# Patient Record
Sex: MALE | Race: WHITE | NOT HISPANIC OR LATINO | Employment: OTHER | URBAN - METROPOLITAN AREA
[De-identification: names, ages, dates, MRNs, and addresses within clinical notes are randomized per-mention and may not be internally consistent; named-entity substitution may affect disease eponyms.]

---

## 2023-07-07 ENCOUNTER — TELEPHONE (OUTPATIENT)
Dept: TRANSPLANT | Facility: CLINIC | Age: 58
End: 2023-07-07

## 2023-07-18 ENCOUNTER — TELEPHONE (OUTPATIENT)
Dept: TRANSPLANT | Facility: CLINIC | Age: 58
End: 2023-07-18
Payer: COMMERCIAL

## 2023-07-21 ENCOUNTER — TELEPHONE (OUTPATIENT)
Dept: TRANSPLANT | Facility: CLINIC | Age: 58
End: 2023-07-21
Payer: COMMERCIAL

## 2023-08-09 ENCOUNTER — TELEPHONE (OUTPATIENT)
Dept: TRANSPLANT | Facility: CLINIC | Age: 58
End: 2023-08-09
Payer: COMMERCIAL

## 2023-08-10 DIAGNOSIS — Z76.82 ORGAN TRANSPLANT CANDIDATE: Primary | ICD-10-CM

## 2023-09-11 ENCOUNTER — TELEPHONE (OUTPATIENT)
Dept: TRANSPLANT | Facility: CLINIC | Age: 58
End: 2023-09-11
Payer: COMMERCIAL

## 2023-09-27 ENCOUNTER — HOSPITAL ENCOUNTER (OUTPATIENT)
Dept: RADIOLOGY | Facility: HOSPITAL | Age: 58
Discharge: HOME | End: 2023-09-27
Attending: NURSE PRACTITIONER
Payer: COMMERCIAL

## 2023-09-27 ENCOUNTER — HOSPITAL ENCOUNTER (OUTPATIENT)
Dept: RADIOLOGY | Facility: HOSPITAL | Age: 58
Discharge: HOME | End: 2023-09-27
Payer: COMMERCIAL

## 2023-09-27 ENCOUNTER — OFFICE VISIT (OUTPATIENT)
Dept: TRANSPLANT | Facility: CLINIC | Age: 58
End: 2023-09-27
Payer: COMMERCIAL

## 2023-09-27 VITALS
HEART RATE: 54 BPM | RESPIRATION RATE: 18 BRPM | DIASTOLIC BLOOD PRESSURE: 91 MMHG | TEMPERATURE: 97 F | SYSTOLIC BLOOD PRESSURE: 171 MMHG | HEIGHT: 68 IN | WEIGHT: 257.25 LBS | OXYGEN SATURATION: 97 % | BODY MASS INDEX: 38.99 KG/M2

## 2023-09-27 DIAGNOSIS — Z76.82 ORGAN TRANSPLANT CANDIDATE: ICD-10-CM

## 2023-09-27 DIAGNOSIS — I48.91 ATRIAL FIBRILLATION, UNSPECIFIED TYPE: ICD-10-CM

## 2023-09-27 DIAGNOSIS — G47.33 OSA (OBSTRUCTIVE SLEEP APNEA): ICD-10-CM

## 2023-09-27 DIAGNOSIS — N18.4 BENIGN HYPERTENSION WITH CKD (CHRONIC KIDNEY DISEASE) STAGE IV: ICD-10-CM

## 2023-09-27 DIAGNOSIS — R80.1 PERSISTENT PROTEINURIA: ICD-10-CM

## 2023-09-27 DIAGNOSIS — Z01.818 PRE-TRANSPLANT EVALUATION FOR CHRONIC KIDNEY DISEASE: Primary | ICD-10-CM

## 2023-09-27 DIAGNOSIS — E66.01 CLASS 2 SEVERE OBESITY DUE TO EXCESS CALORIES WITH SERIOUS COMORBIDITY AND BODY MASS INDEX (BMI) OF 39.0 TO 39.9 IN ADULT: ICD-10-CM

## 2023-09-27 DIAGNOSIS — I12.9 BENIGN HYPERTENSION WITH CKD (CHRONIC KIDNEY DISEASE) STAGE IV: ICD-10-CM

## 2023-09-27 DIAGNOSIS — Q61.2 AUTOSOMAL DOMINANT POLYCYSTIC KIDNEY DISEASE: ICD-10-CM

## 2023-09-27 PROBLEM — K21.9 GASTROESOPHAGEAL REFLUX DISEASE WITHOUT ESOPHAGITIS: Status: ACTIVE | Noted: 2023-03-21

## 2023-09-27 PROBLEM — N32.0 BLADDER OUTLET OBSTRUCTION: Status: ACTIVE | Noted: 2023-04-16

## 2023-09-27 PROBLEM — G89.29 CHRONIC BACK PAIN: Status: ACTIVE | Noted: 2023-04-16

## 2023-09-27 PROBLEM — N40.0 BENIGN PROSTATIC HYPERPLASIA: Status: ACTIVE | Noted: 2021-10-05

## 2023-09-27 PROBLEM — M54.9 CHRONIC BACK PAIN: Status: ACTIVE | Noted: 2023-04-16

## 2023-09-27 PROBLEM — E66.812 CLASS 2 SEVERE OBESITY DUE TO EXCESS CALORIES WITH SERIOUS COMORBIDITY AND BODY MASS INDEX (BMI) OF 39.0 TO 39.9 IN ADULT: Status: ACTIVE | Noted: 2023-09-27

## 2023-09-27 PROCEDURE — 71046 X-RAY EXAM CHEST 2 VIEWS: CPT | Mod: 26,TXP,, | Performed by: RADIOLOGY

## 2023-09-27 PROCEDURE — 72170 X-RAY EXAM OF PELVIS: CPT | Mod: 26,TXP,, | Performed by: RADIOLOGY

## 2023-09-27 PROCEDURE — 76770 US EXAM ABDO BACK WALL COMP: CPT | Mod: 26,TXP,, | Performed by: STUDENT IN AN ORGANIZED HEALTH CARE EDUCATION/TRAINING PROGRAM

## 2023-09-27 PROCEDURE — 99204 OFFICE O/P NEW MOD 45 MIN: CPT | Mod: S$GLB,TXP,, | Performed by: PHYSICIAN ASSISTANT

## 2023-09-27 PROCEDURE — 72170 XR PELVIS ROUTINE AP: ICD-10-PCS | Mod: 26,TXP,, | Performed by: RADIOLOGY

## 2023-09-27 PROCEDURE — 97802 PR MED NUTR THER, 1ST, INDIV, EA 15 MIN: ICD-10-PCS | Mod: S$GLB,TXP,,

## 2023-09-27 PROCEDURE — 99999 PR PBB SHADOW E&M-EST. PATIENT-LVL V: CPT | Mod: PBBFAC,TXP,, | Performed by: NURSE PRACTITIONER

## 2023-09-27 PROCEDURE — 72170 X-RAY EXAM OF PELVIS: CPT | Mod: TC,TXP

## 2023-09-27 PROCEDURE — 76770 US RETROPERITONEAL COMPLETE: ICD-10-PCS | Mod: 26,TXP,, | Performed by: STUDENT IN AN ORGANIZED HEALTH CARE EDUCATION/TRAINING PROGRAM

## 2023-09-27 PROCEDURE — 99205 OFFICE O/P NEW HI 60 MIN: CPT | Mod: S$GLB,TXP,, | Performed by: NURSE PRACTITIONER

## 2023-09-27 PROCEDURE — 71046 X-RAY EXAM CHEST 2 VIEWS: CPT | Mod: TC,TXP

## 2023-09-27 PROCEDURE — 99205 PR OFFICE/OUTPT VISIT, NEW, LEVL V, 60-74 MIN: ICD-10-PCS | Mod: S$GLB,TXP,, | Performed by: NURSE PRACTITIONER

## 2023-09-27 PROCEDURE — 99204 PR OFFICE/OUTPT VISIT, NEW, LEVL IV, 45-59 MIN: ICD-10-PCS | Mod: S$GLB,TXP,, | Performed by: PHYSICIAN ASSISTANT

## 2023-09-27 PROCEDURE — 71046 XR CHEST PA AND LATERAL: ICD-10-PCS | Mod: 26,TXP,, | Performed by: RADIOLOGY

## 2023-09-27 PROCEDURE — 99999 PR PBB SHADOW E&M-EST. PATIENT-LVL V: ICD-10-PCS | Mod: PBBFAC,TXP,, | Performed by: NURSE PRACTITIONER

## 2023-09-27 PROCEDURE — 99204 OFFICE O/P NEW MOD 45 MIN: CPT | Mod: S$GLB,TXP,, | Performed by: TRANSPLANT SURGERY

## 2023-09-27 PROCEDURE — 76770 US EXAM ABDO BACK WALL COMP: CPT | Mod: TC,TXP

## 2023-09-27 PROCEDURE — 99204 PR OFFICE/OUTPT VISIT, NEW, LEVL IV, 45-59 MIN: ICD-10-PCS | Mod: S$GLB,TXP,, | Performed by: TRANSPLANT SURGERY

## 2023-09-27 PROCEDURE — 97802 MEDICAL NUTRITION INDIV IN: CPT | Mod: S$GLB,TXP,,

## 2023-09-27 RX ORDER — AMLODIPINE BESYLATE 5 MG/1
5 TABLET ORAL DAILY
COMMUNITY

## 2023-09-27 RX ORDER — BENAZEPRIL HYDROCHLORIDE 40 MG/1
40 TABLET ORAL
COMMUNITY
Start: 2023-08-30

## 2023-09-27 RX ORDER — TOLVAPTAN 45 MG-15MG
KIT ORAL
COMMUNITY
Start: 2023-09-18

## 2023-09-27 RX ORDER — CARVEDILOL 25 MG/1
TABLET ORAL
COMMUNITY
Start: 2023-09-12

## 2023-09-27 RX ORDER — AMIODARONE HYDROCHLORIDE 200 MG/1
200 TABLET ORAL
COMMUNITY
Start: 2023-08-14

## 2023-09-27 RX ORDER — PANTOPRAZOLE SODIUM 40 MG/1
TABLET, DELAYED RELEASE ORAL
COMMUNITY
Start: 2023-09-21

## 2023-09-27 NOTE — LETTER
September 29, 2023        Sidney Broussard  [address]  [facility] [address]  Phone: [phone]  Fax: [phone]             [facility]  [address]  [facility] [address]  Phone: [phone]   Patient: Kenneth Phillip   MR Number: [id number]   YOB: 1965   Date of Visit: 9/27/2023       Dear Dr. Sidney Broussard    Thank you for referring Kenneth Phillip to me for evaluation. Attached you will find relevant portions of my assessment and plan of care.    If you have questions, please do not hesitate to call me. I look forward to following Kenneth Phillip along with you.    Sincerely,    Carly Bates, NP    Enclosure    If you would like to receive this communication electronically, please contact [email or web address] or [phone] to request [employer] access.    [employer] is a tool which provides read-only access to select patient information with whom you have a relationship. Its easy to use and provides real time access to review your patients record including encounter summaries, notes, results, and demographic information.    If you feel you have received this communication in error or would no longer like to receive these types of communications, please e-mail [email or web address]

## 2023-09-27 NOTE — PROGRESS NOTES
Transplant Surgery  Kidney Transplant Recipient Evaluation    Referring Physician: Sidney Broussard  Current Nephrologist: Sidney Broussard    Subjective:     Reason for Visit: evaluate transplant candidacy    History of Present Illness: Kenneth Phillip is a 58 y.o. year old male undergoing transplant evaluation.    Dialysis History: Kenneth is pre-dialysis.      Transplant History: N/A    Etiology of Renal Disease: Polycystic Kidneys (based on medical records from referral).    External provider notes reviewed: Yes    Review of Systems   Constitutional:  Negative for activity change and appetite change.   HENT:  Negative for congestion and tinnitus.    Eyes:  Negative for redness and visual disturbance.   Respiratory:  Negative for cough and shortness of breath.    Cardiovascular:  Negative for chest pain and palpitations.   Gastrointestinal:  Negative for abdominal distention and abdominal pain.   Endocrine: Negative for polydipsia and polyuria.   Genitourinary:  Negative for decreased urine volume and dysuria.   Musculoskeletal:  Negative for arthralgias and back pain.   Skin:  Negative for pallor and rash.   Allergic/Immunologic: Negative for environmental allergies and immunocompromised state.   Neurological:  Negative for tremors and headaches.   Hematological:  Negative for adenopathy. Does not bruise/bleed easily.   Psychiatric/Behavioral:  Negative for behavioral problems and confusion.      Objective:     Physical Exam:  Constitutional:   Vitals reviewed: yes   Well-nourished and well-groomed: yes  Eyes:   Sclerae icteric: no   Extraocular movements intact: yes  GI:    Bowel sounds normal: yes   Tenderness: no    If yes, quadrant/location: not applicable   Palpable masses: no    If yes, quadrant/location: not applicable   Hepatosplenomegaly: no   Ascites: no   Hernia: no    If yes, type/location: not applicable   Surgical scars: no    If yes, type/location: not applicable  Resp:   Effort normal: yes   Breath  sounds normal: yes    CV:   Regular rate and rhythm: yes   Heart sounds normal: yes   Femoral pulses normal: yes   Extremities edematous: no  Skin:   Rashes or lesions present: no    If yes, describe:not applicable   Jaundice:: no    Musculoskeletal:   Gait normal: yes   Strength normal: yes  Psych:   Oriented to person, place, and time: yes   Affect and mood normal: yes    Additional comments:  Marked central obesity    Diagnostics:  The following labs have been reviewed: CBC  CMP  The following radiology images have been independently reviewed and interpreted:     Counseling: We provided Kenneth Phillip with a group education session today.  We discussed kidney transplantation at length with him, including risks, potential complications, and alternatives in the management of his renal failure.  The discussion included complications related to anesthesia, bleeding, infection, primary nonfunction, and ATN.  I discussed the typical postoperative course, length of hospitalization, the need for long-term immunosuppression, and the need for long-term routine follow-up.  I discussed living-donor and -donor transplantation and the relative advantages and disadvantages of each.  I also discussed average waiting times for both living donation and  donation.  I discussed national and center-specific survival rates.  I also mentioned the potential benefit of multicenter listing to candidates listed with centers within more than one organ procurement organization.  All questions were answered.    Patient advised that it is recommended that all transplant candidates, and their close contacts and household members receive Covid vaccination.    Final determination of transplant candidacy will be made once evaluation is complete and reviewed by the Kidney & Kidney/Pancreas Selection Committee.    Coronavirus disease (COVID-19) caused by severe acute respiratory virus coronavirus 2 (SARS-C0V 2) is associated with  increased mortality in solid organ transplant recipients (SOT) compared to non-transplant patients. Vaccine responses to vaccination are depressed against SARS-CoV2 compared to normal individuals but improve with third vaccination doses. Vaccination prior to SOT provides both the best opportunity for transplant candidates to develop protective immunity and to reduce the risk of serious COVID19 infections post transplantation. Organ transplant candidates at [facility] will be required to receive SARS-CoV-2 vaccination prior to being listed with a an active status, whenever possible. Exceptions will be made for disability related reasons or for sincerely held Yarsani beliefs.          Transplant Surgery - Candidacy   Assessment/Plan:   Kenneth Phillip is pre-dialysis with CKD stage 4 (GFR 15-29 mL/min). I have concerns with his weight and body habitus. Based on available information, Kenneth Phillip is a marginal kidney transplant candidate. I stipulated he would need to lose weight prior to kidney transplantation. He had a CT scan at an outside hospital that should be reviewed.    Additional testing to be completed according to the Written Order Guidelines for Adult Pre-kidney and Pancreas Transplant Evaluation (KI-02).  Interpretation of tests and discussion of patient management involves all members of the multidisciplinary transplant team.    Dyllan Tenorio MD

## 2023-09-27 NOTE — PROGRESS NOTES
PRE-TRANSPLANT INFECTIOUS DISEASE CONSULT    Reason for Visit:  Pre-transplant evaluation  Referring Provider: Dr. Sidney Broussard     History of Present Illness:    58 y.o. male with a history of CKD not on HD presents for pre-kidney transplant evaluation.    Infectious History:  Recent hospital admissions: No  Recent infections: No  Recent or current antibiotic use: No  History of recurrent infections *(sinus / pneumonia / UTI / SBP)*: No  History of  foot wound or bone/joint infection: No  Recent dental infections, issues or procedures: Yes - wisdom tooth pulled - took amox before - 2 weeks ago  History of chicken pox: No  History of shingles: No  History of STI: No  History of COVID infection: No    History of Immunosuppression:  Prior chemotherapy / immunosuppression: No  Prior transplant: No  History of splenectomy: No    Tuberculosis:  Prior screening for latent TB: No  Prior diagnosis of latent TB: No  Risk factors for TB *known exposure, incarceration, homelessness*: No    Geographical exposures:  Currently lives in [place] with wife  Lived in the following states: [place]  Lived or travelled to the [facility] [place]: No  International travel: No  Travel-associated illness: No    Social/Environmental:  Occupation:  Retired  Pets: Yes   Livestock: No  Fishing / hunting: Yes - fresh water/hunt  Hobbies: Fishing  Water: [place]  Consumption of raw/undercooked meat or seafood?  No  Tobacco: No  Alcohol: Yes very occasionally  Recreational drug use:  No  Sexual partners: wife      Past Histories:   Past Medical History:   Diagnosis Date    Anemia     Atrial fibrillation     Disorder of kidney and ureter     GERD (gastroesophageal reflux disease)     Hypertension     Obesity     Polycystic kidney disease      Past Surgical History:   Procedure Laterality Date    CARDIAC ELECTROPHYSIOLOGY STUDY AND ABLATION      TOTAL KNEE ARTHROPLASTY Right      Family History   Problem Relation Age of Onset    Kidney disease Father      Polycystic kidney disease Father     Polycystic kidney disease Sister     Kidney disease Sister     Kidney disease Son     Polycystic kidney disease Son      Social History     Tobacco Use    Smoking status: Former     Current packs/day: 0.00     Types: Cigarettes     Quit date: 9/27/2013     Years since quitting: 10.0    Smokeless tobacco: Never     Review of patient's allergies indicates:   Allergen Reactions    Penicillins Other (See Comments)         Immunization History:  Received all childhood vaccines: Yes  All household members receive annual flu vaccine: No  All household members are up to date on COVID vaccine: No      Current antibiotics:  Antibiotics (From admission, onward)      None              Review of Systems  Review of Systems   Constitutional: Negative for chills, decreased appetite, fever, malaise/fatigue, night sweats, weight gain and weight loss.   HENT:  Negative for congestion, ear pain, hearing loss, hoarse voice, sore throat and tinnitus.    Eyes:  Negative for blurred vision, redness and visual disturbance.   Cardiovascular:  Negative for chest pain, leg swelling and palpitations.   Respiratory:  Negative for cough, hemoptysis, shortness of breath, sputum production and wheezing.    Endocrine: Negative for cold intolerance and heat intolerance.   Hematologic/Lymphatic: Negative for adenopathy. Does not bruise/bleed easily.   Skin:  Negative for dry skin, itching, rash and suspicious lesions.   Musculoskeletal:  Negative for back pain, joint pain, myalgias and neck pain.   Gastrointestinal:  Negative for abdominal pain, constipation, diarrhea, heartburn, nausea and vomiting.   Genitourinary:  Negative for dysuria, flank pain, frequency, hematuria, hesitancy and urgency.   Neurological:  Negative for dizziness, headaches, numbness, paresthesias and weakness.   Psychiatric/Behavioral:  Negative for depression and memory loss. The patient does not have insomnia and is not nervous/anxious.   "  Allergic/Immunologic: Negative for environmental allergies, HIV exposure, hives and persistent infections.          Objective  Physical Exam  Constitutional:       General: He is not in acute distress.     Appearance: Normal appearance. He is well-developed. He is not ill-appearing, toxic-appearing or diaphoretic.   HENT:      Head: Normocephalic and atraumatic.      Mouth/Throat:      Lips: Pink. No lesions.      Mouth: Mucous membranes are moist. No oral lesions.      Dentition: Abnormal dentition. Dental caries (filled) and dental abscesses present. No gum lesions.      Tongue: No lesions.      Palate: No lesions.   Cardiovascular:      Rate and Rhythm: Normal rate and regular rhythm.      Heart sounds: Normal heart sounds. No murmur heard.     No friction rub. No gallop.   Pulmonary:      Effort: Pulmonary effort is normal. No respiratory distress.      Breath sounds: Normal breath sounds. No wheezing or rales.   Abdominal:      General: Bowel sounds are normal. There is no distension.      Palpations: Abdomen is soft. There is no mass.      Tenderness: There is no abdominal tenderness. There is no guarding or rebound.   Skin:     General: Skin is warm and dry.   Neurological:      Mental Status: He is alert and oriented to person, place, and time.   Psychiatric:         Behavior: Behavior normal.           Labs:    CBC:   Lab Results   Component Value Date    WBC 8.17 09/27/2023    HGB 12.7 (L) 09/27/2023    HCT 40.6 09/27/2023     (H) 09/27/2023     09/27/2023    GRAN 6.2 09/27/2023    GRAN 75.8 (H) 09/27/2023    LYMPH 1.0 09/27/2023    LYMPH 12.7 (L) 09/27/2023    MONO 0.6 09/27/2023    MONO 7.1 09/27/2023    EOSINOPHIL 3.3 09/27/2023       Syphilis screening:   Lab Results   Component Value Date    RPR Non-reactive 09/27/2023        TB screening: No results found for: "TBGOLDPLUS", "TSPOTSCREN"    HIV screening:   Lab Results   Component Value Date    [id number] Non-reactive 09/27/2023 " "      Strongyloides IgG: No results found for: "STRONGANTIGG"    Hepatitis Serologies:   Lab Results   Component Value Date    HEPAIGG Non-reactive 09/27/2023    HEPBCAB Non-reactive 09/27/2023    HEPBSAB <3.00 09/27/2023    HEPBSAB Non-reactive 09/27/2023    HEPCAB Non-reactive 09/27/2023        Varicella IgG:   Lab Results   Component Value Date    VARICELLAINT Positive 09/27/2023         Immunization History   Administered Date(s) Administered    COVID-19, MRNA, LN-S, PF (MODERNA FULL 0.5 ML DOSE) 10/20/2022    COVID-19, MRNA, LN-S, PF (Pfizer) (Purple Cap) 03/19/2021, 04/18/2021, 12/10/2021          Assessment and Plan    1. Risks of Infection: Available serologies were reviewed. No unusual risks of infection or significant barriers to transplantation were identified from the infectious disease standpoint given the information available at this time.    - Acute infectious issues: None   - Pending serologies: Quantiferon gold / T-spot and Strongyloides IgG   - Please call if any pending serologic testing is positive.    2. Immunizations:  Based on the patient's immunization history and serologies, the following immunizations are recommended:  - Hepatitis A    Patient does not have immunity to hepatitis A    Vaccination ordered today: Yes   - Hepatitis B    Patient does not have immunity to hepatitis B    Vaccination ordered today: Yes   - COVID    Current CDC vaccination recommendations were discussed with the patient   - Annual high dose influenza     Vaccination ordered today: Yes   - Prevnar 20    Vaccination ordered today: Yes   - Tdap    Vaccination ordered today: Yes   - Shingrix    Vaccination ordered today: Yes    Recommended Pre-Transplant Immunization Schedule   Vaccine  0m 1m 2m 6m   Pneumococcal conjugate vaccine (Prevnar 20) X      Tetanus-diphtheria-pertussis (Tdap)* X      Hepatitis A Vaccine (Havrix)** X   X   Hepatitis B Vaccine (Heplisav)** X X     Influenza (annual) X      Zoster Recombinant " Vaccine (Shingrix) X  X           *Administer booster every 10 years.       **Administer if no immunity demonstrated on serologies               Patient will receive vaccines at local pharmacy. A written prescription was provided for all vaccine doses.     3. Counseling:   I discussed with the patient the risk for increased susceptibility to infections following transplantation including increased risk for infection right after transplant and if rejection should occur.  The patient has been counseled on the importance of vaccinations to decrease risk of infection and severe illness. Specific guidance has been provided to the patient regarding the patient's occupation, hobbies and activities to avoid future infectious complications.     4. Transplant Candidacy: Based on available information, there are no identified significant barriers to transplantation from an infectious disease standpoint.  Final determination of transplant candidacy will be made once evaluation is complete and reviewed by the Selection Committee.    5. Vaccine and serology needs:      Follow up with infectious disease as needed.       The total time for evaluation and management services performed on 09/27/2023 was greater than 45minutes.

## 2023-09-27 NOTE — PROGRESS NOTES
"TRANSPLANT NUTRITIONAL ASSESSMENT    Referring Provider: Sidney Broussard MD     Reason for Visit: Pre-kidney transplant work-up (pre-dialysis)    Age: 58 y.o.  Sex: male    Patient Active Problem List   Diagnosis    Autosomal dominant polycystic kidney disease    Atrial fibrillation    Bladder outlet obstruction    Gastroesophageal reflux disease without esophagitis    Persistent proteinuria    Benign hypertension with CKD (chronic kidney disease) stage IV    Chronic back pain    Benign prostatic hyperplasia    Class 2 severe obesity due to excess calories with serious comorbidity and body mass index (BMI) of 39.0 to 39.9 in adult    Pre-transplant evaluation for chronic kidney disease    NATASHA (obstructive sleep apnea)     Past Medical History:   Diagnosis Date    Anemia     Atrial fibrillation     Disorder of kidney and ureter     GERD (gastroesophageal reflux disease)     Hypertension     Obesity     Polycystic kidney disease      Lab Results   Component Value Date     09/27/2023    K 4.7 09/27/2023    PHOS 4.9 (H) 09/27/2023    CHOL 146 09/27/2023    HDL 35 (L) 09/27/2023    TRIG 115 09/27/2023    ALBUMIN 4.3 09/27/2023    CALCIUM 9.1 09/27/2023     Other Pertinent Labs: N/A    Current Outpatient Medications   Medication Sig    rivaroxaban (XARELTO) 20 mg Tab Take 1 tablet by mouth once daily.    amiodarone (PACERONE) 200 MG Tab Take 200 mg by mouth.    benazepriL (LOTENSIN) 40 MG tablet Take 40 mg by mouth.    carvediloL (COREG) 25 MG tablet     JYNARQUE 45 mg (AM)/ 15 mg (PM) TbSQ     pantoprazole (PROTONIX) 40 MG tablet      No current facility-administered medications for this visit.     Allergies: Penicillins    Ht Readings from Last 1 Encounters:   09/27/23 5' 7.87" (1.724 m)     Wt Readings from Last 1 Encounters:   09/27/23 116.7 kg (257 lb 4.4 oz)      BMI: Body mass index is 39.26 kg/m².    Usual Weight: 235 lbs in Lul  Weight Change/Time: unintentional 15 lb wt gain 2/2 retired and knee " replacement   Current Diet: regular  Appetite/Current Intake: good   Exercise/Physical Activity: functional in ADLs; 1 mile walk/day   Nutritional/Herbal Supplements: none  Chewing/Swallowing Problems: none  Symptoms: none    Estimated Kcal Need: 2334 kcal/day (20 kcal/kg)   Estimated Protein Need: 93 gm/day (0.8 gm/kg)     Nutritional History:   Pt and spouse present. Pt reports consuming 2 meals/day with snacks. Pt consumes vegetables less than a couple times a week.     Diet Recall    Mornin cups coffee (cream, sugar) with sausage, egg, cheese biscuit from gas station/freezer or frosted mini wheat with whole milk     Evening: enjoys beef, chicken, pork, deer, frozen pizzas, eggplant casserole, green beans, fried okra, peas, dried beans, broccoli with cheese, corn, side salad (lettuce, tomato, cheese, molina bits, Ranch)     Snacks/Desserts: chips, pretzels, fruit cups, grapes, strawberries, cookies, popcorn    Beverages: ~1 gallon water/day, brisk tea, sprite occasionally, milk daily       Seasonings: Jayden's, herbs and spice, salt     Restaurants/Fast Food: 1x/wk       Nutritional Diagnoses  Problem: food- and nutrition-related knowledge deficit  Etiology: RT lack of previous education on pre-kidney transplant nutrition recommendations  Symptoms: AEB diet recall and questions from pt     Educational Need? yes  Barriers: none identified  Discussed with: patient and spouse  Interventions: Patient taught nutrition information regarding Pre-kidney transplant work-up (pre-dialysis). Renal Nutrition Therapy packet reviewed (high/low food sources of K, Phos and protein, low sodium and fluid intake, emphasis on moderate protein intake). Encouraged physical activity daily, regular exercise as tolerated, stay mobile.  Goals/Recommendations: diet adherence and limit intake of high phosphorus foods  Actions Taken: instruct/provide written information  Patient and/or family comprehend instructions: yes  Outcome: Verbalizes  understanding  Monitoring: Contact information provided, will f/u in clinic and communicate with the care team as needed.     Counseling Time: 20 minutes

## 2023-09-27 NOTE — PROGRESS NOTES
Transplant Nephrology  Kidney Transplant Recipient Evaluation    Referring Physician: Sidney Broussard  Current Nephrologist: Sidney Broussard    Subjective:   CC:  Initial evaluation of kidney transplant candidacy.    HPI:  Mr. Phillip is a 58 y.o. year old Unknown male who has presented to be evaluated as a potential kidney transplant recipient.  He has advanced kidney disease secondary to polycystic kidney disease.  Patient is currently pre-dialysis. He has   no access  for dialysis access.   9/27/23  eGFR >60 mL/min/1.73 m^2 13.5 Abnormal      Previous Transplant: no    PKD (+ family hx: Father, sister, son)  Denies Family HX aneurysm     Donor:  yes   Discussed living donation       Fx assessment: Had a right knee replacement this year, completed rehab/PT. He continues walking ~ 1 mile every AM.   He does not appear frail.   He reports gaining ~ 8-10 lbs recently due to fluid retention and is actively trying to lose weight.     Body mass index is 39.26 kg/m².  Encourage lifestyle modifications: diet, exercise, weight loss   Needs to maintain acceptable BMI for txp/guidelines     Afib: s/p cardiac ablation  Meds:  Amiodarone and Xarelto --which he stopped ~ 2 weeks ago on his own due to bleeding of his gums --he has not notified or f/u with cards   -discussed contacting his cardiologist ASAP to discuss gum bleeding and to get guidance on Xarelto needs  Cardiologist : Dr Man    Recently diagnosed with sleep apnea and is waiting for CPAP to arrive, with ETA of 12/2023      Past Medical and Surgical History: Mr. Phillip  has a past medical history of Anemia, Atrial fibrillation, Disorder of kidney and ureter, GERD (gastroesophageal reflux disease), Hypertension, Obesity, and Polycystic kidney disease.  He has a past surgical history that includes Total knee arthroplasty (Right) and Cardiac electrophysiology study and ablation.    Past Social and Family History: Mr. Phillip reports that he quit smoking  "about 10 years ago. His smoking use included cigarettes. He has never used smokeless tobacco. His family history includes Kidney disease in his father, sister, and son; Polycystic kidney disease in his father, sister, and son.      Past Medical History:   Diagnosis Date    Anemia     Atrial fibrillation     Disorder of kidney and ureter     GERD (gastroesophageal reflux disease)     Hypertension     Obesity     Polycystic kidney disease        Review of Systems   Constitutional:  Positive for unexpected weight change.   Respiratory:  Positive for apnea (hx NATASHA) and wheezing.    Cardiovascular:  Positive for palpitations (afib) and leg swelling.   Gastrointestinal:  Positive for abdominal distention.        HX GERD   Musculoskeletal:  Positive for arthralgias (s/p right knee replacement) and back pain (chronic).   Hematological:  Bruises/bleeds easily (Xarelto).   Psychiatric/Behavioral:  Positive for sleep disturbance.        Objective:   Blood pressure (!) 171/91, pulse (!) 54, temperature 97.3 °F (36.3 °C), temperature source Tympanic, resp. rate 18, height 5' 7.87" (1.724 m), weight 116.7 kg (257 lb 4.4 oz), SpO2 97 %.body mass index is 39.26 kg/m².    Physical Exam  Constitutional:       Appearance: He is obese.   Abdominal:      General: There is distension.   Musculoskeletal:      Right lower leg: Edema present.      Left lower leg: Edema present.      Comments: ~ +2 peripheral edema bilaterally          Labs:  Lab Results   Component Value Date    WBC 8.17 09/27/2023    HGB 12.7 (L) 09/27/2023    HCT 40.6 09/27/2023     09/27/2023    K 4.7 09/27/2023     (H) 09/27/2023    CO2 22 (L) 09/27/2023    BUN 46 (H) 09/27/2023    CREATININE 4.7 (H) 09/27/2023    EGFRNORACEVR 13.5 (A) 09/27/2023    CALCIUM 9.1 09/27/2023    PHOS 4.9 (H) 09/27/2023    ALBUMIN 4.3 09/27/2023    AST 12 09/27/2023    ALT 16 09/27/2023    .9 (H) 09/27/2023       No results found for: "PREALBUMIN", "BILIRUBINUA", "GGT", " ""AMYLASE", "LIPASE", "PROTEINUA", "NITRITE", "RBCUA", "WBCUA"    No results found for: "HLAABCTYPE"    Labs were reviewed with the patient.    Assessment:     1. Pre-transplant evaluation for chronic kidney disease    2. Benign hypertension with CKD (chronic kidney disease) stage IV    3. Autosomal dominant polycystic kidney disease    4. Atrial fibrillation, unspecified type    5. Class 2 severe obesity due to excess calories with serious comorbidity and body mass index (BMI) of 39.0 to 39.9 in adult    6. Persistent proteinuria    7. NATASHA (obstructive sleep apnea)        Plan:   Body mass index is 39.26 kg/m².  Encourage to continue  lifestyle modifications: diet, exercise, weight loss   And Needing  to maintain acceptable BMI for txp/guidelines     HX HTN, Afib, medically manage, on Anticoagulation /Xarelto--cardiology clearance and Recs   Encouraged to f/u ASAP with cardiology c/o Xarelto needs    Transplant Candidacy:   Based on available information, Mr. Phillip is a suitable kidney transplant candidate.   Meets center eligibility for accepting HCV+ donor offer - Yes.  Patient educated on HCV+ donors. Kenneth is willing to accept HCV+ donor offer - Yes   Patient is a candidate for KDPI > 85 kidney donor offer - No.  Final determination of transplant candidacy will be made once workup is complete and reviewed by the selection committee.    Patient advised that it is recommended that all transplant candidates, and their close contacts and household members receive Covid vaccination.    UNOS Patient Status  Functional Status: 80% - Normal activity with effort: some symptoms of disease       Any Previous Malignancy:  no   Carly Bates NP           "

## 2023-09-28 NOTE — PROGRESS NOTES
INITIAL PATIENT EDUCATION NOTE    Mr. Kenneth Phillip was seen in pre-kidney transplant clinic for evaluation for kidney, kidney/pancreas or pancreas only transplant.  The patient attended a an individual video education session that discussed/reviewed the following aspects of transplantation: evaluation including diagnostic and laboratory testing,( Chemistries, Hematology, Serologies including HIV and Hepatitis and HLA) required for transplantation and selection committee process, UNOS waitlist management/multiple listings, types of organs offered (KDPI < 85%, KDPI > 85%, PHS risk, DCD, HCV+, HIV+ for HIV+ recipients and enbloc/dual), financial aspects, surgical procedures, dietary instruction pre- and post-transplant, health maintenance pre- and post-transplant, post-transplant hospitalization and outpatient follow-up, potential to participate in a research protocol, and medication management and side effects.  A question and answer session was provided after the presentation.    The patient was seen by all members of the multi-disciplinary team to include: Nephrologist/HAKEEM, Surgeon, , Transplant Coordinator, , Pharmacist and Dietician (if applicable).    The patient reviewed and signed all consents for evaluation which were witnessed and sent to scanning into the [facility] chart.    The patient was given an education book and plan for further evaluation based on his individual assessment.      Reviewed program requirement for complete COVID vaccination with documentation prior to listing.  COVID education information reviewed with patient. Patient encouraged to be up to date on all vaccinations.       The patient was informed that the transplant team would manage immediate post op pain. If the patient requires long term pain management, they will need to have that pain management addressed by their PCP or previous provider who wrote for long term pain medicines.    The patient was  encouraged to call with any questions or concerns.

## 2023-10-03 NOTE — PROGRESS NOTES
Transplant Recipient Adult Psychosocial Assessment    SW completed assessment with patient and pt's wife Ira Phillip in clinic.    Kenneth Phillip  [address] [facility] [address]  Telephone Information:   Mobile [phone]   Home  [phone] (home)  Work  There is no work phone number on file.  E-mail  No e-mail address on record    Sex: male  YOB: 1965  Age: 58 y.o.    Encounter Date: 2023  [place] Citizen: yes  Primary Language: English   Needed: no    Emergency Contact:  Name: Ira Phillip  Relationship: wife  Address: same as patient  Phone Numbers: [phone] (mobile) / [phone] (home)    Family/Social Support:   Number of dependents/: Pt denies having any dependents.  Marital history: Pt reports he and wife Ira have been together 20 years.  Other family dynamics: Patient's parents are .  Pt has one adult son living in [place] and one adult daughter living near pt in [place], [place].  Patient has one sister Slime Phillip who is a liver / kidney transplant recipient at [facility].    Household Composition:  Name: Kenneth Phillip  Age: 58  Relationship: patient  Does person drive? yes    Name: Ira Phillip  Age: 58  Relationship: wife  Does person drive? yes    Do you and your caregivers have access to reliable transportation? yes  PRIMARY CAREGIVER: Ira Phillip, pt's wife, will be primary caregiver, phone number [phone].      provided in-depth information to patient and caregiver regarding pre- and post-transplant caregiver role.   strongly encourages patient and caregiver to have concrete plan regarding post-transplant care giving, including back-up caregiver(s) to ensure care giving needs are met as needed.    Patient and Caregiver states understanding all aspects of caregiver role/commitment and is able/willing/committed to being caregiver to the fullest extent necessary.    Patient and Caregiver verbalizes understanding of the  education provided today and caregiver responsibilities.         remains available. Patient and Caregiver agree to contact  in a timely manner if concerns arise.      Able to take time off work without financial concerns:  N/A - Ira does not work .     Additional Significant Others who will Assist with Transplant:  Patient denies having additional caregivers identified at this time.  SW strongly encouraged pt to speak with family and friends to identify additional caregivers who can provide support during pt's transplant recovery in the event pt's wife Ira would be unable to do so.    Living Will: no  Healthcare Power of : no  Advance Directives on file: <<no information> per medical record.  Verbally reviewed LW/HCPA information.   provided patient with copy of LW/HCPA documents and provided education on completion of forms.    Living Donors: No. Education and resource information given to patient.    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  Pt reports retiring in January 2023.    Spouse/Significant Other Employment: Pt's wife Ira does not work.    Disabled: no - Pt states plans of applying for SSI / SSDI.    Monthly Income:  halfway: $2,500 (via pt's 401k)  Able to afford all costs now and if transplanted, including medications: yes  Patient and Caregiver verbalizes understanding of personal responsibilities related to transplant costs and the importance of having a financial plan to ensure that patients transplant costs are fully covered.     Social  worker provided fundraising information/education.  Patient and Caregiver verbalizes understanding.   remains available.    Insurance:   Payer/Plan Subscr  Sex Relation Sub. Ins. ID Effective Group Num   1. [employer]* SUZE LAYNE 1965 Male Self [id number] 3/1/23                                    [address]   2. [employer] NETW* SUZE LAYNE 1965 Male Self [id number]                                    [address] [facility] [id number]     Primary Insurance (for UNOS reporting): Private Insurance - via Healthcare Marketplace / Obamacare  Secondary Insurance (for UNOS reporting): None - Pt states plans of applying for SSI / SSDI and will explore options for Medicare coverage in the event pt is approved for SSDI.  Patient and Caregiver verbalizes clear understanding that patient may experience difficulty obtaining and/or be denied insurance coverage post-surgery. This includes and is not limited to disability insurance, life insurance, health insurance, burial insurance, long term care insurance, and other insurances.    Patient and Caregiver also reports understanding that future health concerns related to or unrelated to transplantation may not be covered by patient's insurance.  Resources and information provided and reviewed.      Patient and Caregiver provides verbal permission to release any necessary information to outside resources for patient care and discharge planning.  Resources and information provided are reviewed.      Dialysis Adherence:  Patient reports currently being pre-dialysis, current nephrologist being Sidney Broussard MD, and maintaining full adherence to Dr. Broussard's plan of care and recommendations.  Per SW's review of nephrology visit notes in chart (via Care Everywhere), no concerns regarding pt's treatment compliance noted over the past 3 months.    Infusion Service: patient utilizing? no  Home Health: patient utilizing? no  DME: yes - CPAP; cane; crutches;  walker - pt only required use of DME while recovering from knee replacement surgery  Pulmonary/Cardiac Rehab: no   ADLS:  Pt reports being independent with all ADLs and mobility and driving himself.    Adherence:   Pt/caregiver reports pt has exhibited good adherence to medication regimen, appointment schedule, and medical recommendations in the past.  Adherence education and counseling provided.     Per History Section:  Past Medical History:   Diagnosis Date    Anemia     Atrial fibrillation     Disorder of kidney and ureter     GERD (gastroesophageal reflux disease)     Hypertension     Obesity     Polycystic kidney disease      Social History     Tobacco Use    Smoking status: Former     Current packs/day: 0.00     Types: Cigarettes     Quit date: 9/27/2013     Years since quitting: 10.0    Smokeless tobacco: Never   Substance Use Topics    Alcohol use: Not on file     Social History     Substance and Sexual Activity   Drug Use Not on file     Social History     Substance and Sexual Activity   Sexual Activity Not on file       Per Today's Psychosocial:  Tobacco: none, patient denies any use.  Pt reports quitting smoking at age 48 and was smoking 1 ppd prior to quitting.  Alcohol: Pt reports ETOH use as only on occasion and typically drinking 1 or 2 beers.  Illicit Drugs/Non-prescribed Medications: none, patient denies any use.  Pt reports maintaining prescription for opioid pain medication prescribed by nephrologist due to pain associated with PKD.    Patient and Caregiver states clear understanding of the potential impact of substance use as it relates to transplant candidacy and is aware of possible random substance screening.  Substance abstinence/cessation counseling, education and resources provided and reviewed.     Arrests/DWI/Treatment/Rehab: patient denies    Psychiatric History:    Mental Health: Pt denies any past and/or present mental health symptoms and/or diagnoses.  Psychiatrist/Counselor: Pt  denies ever receiving care from psychiatrist/counselor.  Medications:  Pt denies ever being prescribed medications for mental health care.  Suicide/Homicide Issues: Pt denies any past and/or present SI/HI.   Safety at home: Pt denies having any past or present concerns regarding safety at home including but not limited to physical/emotional/sexual abuse, neglect, or exploitation.    Knowledge: Patient and Caregiver states having clear understanding and realistic expectations regarding the potential risks and potential benefits of organ transplantation and organ donation, agrees to discuss with health care team members and support system members, and to utilize available resources and express questions and/or concerns in order to further facilitate the pt informed decision-making.  Resources and information provided and reviewed.     Patient and Caregiver is aware of [facility]'s affiliation and/or partnership with agencies in home health care, LTAC, SNF, [facility] and other hospitals and clinics.    Understanding: Patient and Caregiver reports having a clear understanding of the many lifetime commitments involved with being a transplant recipient, including costs, compliance, medications, lab work, procedures, appointments, concrete and financial planning, preparedness, timely and appropriate communication of concerns, abstinence (ETOH, tobacco, illicit non-prescribed drugs), adherence to all health care team recommendations, support system and caregiver involvement, appropriate and timely resource utilization and follow-through, mental health counseling as needed/recommended, and patient and caregiver responsibilities.  Social Service Handbook, resources and detailed educational information provided and reviewed.  Educational information provided.    Patient and Caregiver also reports current and expected compliance with health care regime and states having a clear understanding of the importance of compliance.       Patient and Caregiver reports a clear understanding that risks and benefits may be involved with organ transplantation and with organ donation.      Patient and Caregiver also reports clear understanding that psychosocial risk factors may affect patient, and include but are not limited to feelings of depression, generalized anxiety, anxiety regarding dependence on others, post traumatic stress disorder, feelings of guilt and other emotional and/or mental concerns, and/or exacerbation of existing mental health concerns.  Detailed resources provided and discussed.     Patient and Caregiver agrees to access appropriate resources in a timely manner as needed and/or as recommended, and to communicate concerns appropriately.  Patient and Caregiver also reports a clear understanding of treatment options available.      reviewed education, provided additional information, and answered questions.    Feelings or Concerns: Pt expresses motivation to continue pursuing transplant and denies any transplant related concerns at this time.    Coping: Identify Patient & Caregiver Strategies to [place]:   1. In the past - hunting / fishing; family support   2. Currently & Pre-transplant - same as above   3. At the time of surgery - family support   4. During post-Transplant & Recovery Period - all of the above    Goals: Pt reports post-transplant goals include living longer, vacationing, and enjoying alf.  Patient referred to Vocational Rehabilitation.    Interview Behavior: Patient and Caregiver presents as alert and oriented x 4, pleasant, good eye contact, well groomed, recall good, concentration/judgement good, average intelligence, calm, communicative, cooperative, and asking and answering questions appropriately.  Patient accompanied to clinic by wife Ira Phillip who presents as highly attentive, engaged, and supportive in pt's pursuit of transplant.         Transplant Social Work -  Candidacy  Assessment/Plan:     Psychosocial Suitability: Based on psychosocial risk factors, patient presents as  low risk  candidate for kidney transplant at this time due to established caregiver plan, no documented non-adherence to nephrology plan of care, no reported history of substance abuse and/or mental health concerns, adequate insurance coverage and personal finances to cover transplant cost, and realistic beliefs and expectations regarding risks and benefits of transplant.  Patient would benefit from identifying secondary caregiver support in case wife / primary caregiver is ever unable to provide support during pt's transplant recovery.    Final determination of transplant candidacy to be made by Transplant Selection Committee.    Recommendations/Additional Comments: SW recommends pt establish local lodging plan for immediate post-transplant recovery period.  SW recommends pt identify secondary caregiver support.  SW recommends that pt conduct fundraising to assist pt with pay for cost of medications, food, gas, and other transplant related needs.  SW recommends that pt remain aware of potential mental health concerns and contact the team if any concerns arise.  SW recommends that pt remain abstinent from tobacco, ETOH, and drug use.  SW supports pt's continued adherence. SW remains available to answer any questions or concerns that arise as the pt moves through the transplant process.     Joe Bello

## 2023-10-04 ENCOUNTER — DOCUMENTATION ONLY (OUTPATIENT)
Dept: TRANSPLANT | Facility: CLINIC | Age: 58
End: 2023-10-04
Payer: COMMERCIAL

## 2023-10-13 ENCOUNTER — TELEPHONE (OUTPATIENT)
Dept: TRANSPLANT | Facility: CLINIC | Age: 58
End: 2023-10-13
Payer: COMMERCIAL

## 2023-10-13 NOTE — TELEPHONE ENCOUNTER
Patient mailed a copy of stress and echo to me. I should be receiving it Thursday. Patient informed that once records are received, I can review them and determine if patient can be presented to kidney selection committee. Patient and wife both voiced understanding.     ----- Message from Luz Maria Washington sent at 10/13/2023 10:12 AM CDT -----  Regarding: Patient advice  Contact: Pt [phone]            Name of Caller:  Kenneth Saunders Preference:  [phone]    Nature of Call:  States he has completed everything needed from him would like to touch bases

## 2023-10-24 ENCOUNTER — TELEPHONE (OUTPATIENT)
Dept: TRANSPLANT | Facility: CLINIC | Age: 58
End: 2023-10-24
Payer: COMMERCIAL

## 2023-10-24 NOTE — PROGRESS NOTES
PHARM.D. PRE-TRANSPLANT NOTE:    This patient's medication therapy was evaluated as part of his pre-transplant evaluation.      The following general pharmacologic concerns were noted: on rivaroxaban for atrial fibrillation     The following concerns for post-operative pain management were noted: none     The following pharmacologic concerns related to HCV therapy were noted: none       This patient's medication profile was reviewed for considerations for DAA Hepatitis C therapy:    [X]  No current inducers of CYP 3A4 or PGP  [Yes]  No amiodarone on this patient's EMR profile in the last 24 months  [Yes]  No past or current atrial fibrillation on this patient's EMR profile       Current Outpatient Medications   Medication Sig Dispense Refill    amiodarone (PACERONE) 200 MG Tab Take 200 mg by mouth.      amLODIPine (NORVASC) 5 MG tablet Take 5 mg by mouth once daily.      benazepriL (LOTENSIN) 40 MG tablet Take 40 mg by mouth.      carvediloL (COREG) 25 MG tablet       JYNARQUE 45 mg (AM)/ 15 mg (PM) TbSQ       pantoprazole (PROTONIX) 40 MG tablet       rivaroxaban (XARELTO) 20 mg Tab Take 1 tablet by mouth once daily.       No current facility-administered medications for this visit.           I am available for consultation and can be contacted, as needed by the other members of the Transplant team.

## 2023-10-25 ENCOUNTER — TELEPHONE (OUTPATIENT)
Dept: TRANSPLANT | Facility: CLINIC | Age: 58
End: 2023-10-25
Payer: COMMERCIAL

## 2023-10-25 NOTE — TELEPHONE ENCOUNTER
Compliance form resent to [facility] Fax: [phone]  Phone: [phone]  Sidney Pineda MA

## 2023-10-26 ENCOUNTER — TELEPHONE (OUTPATIENT)
Dept: TRANSPLANT | Facility: CLINIC | Age: 58
End: 2023-10-26
Payer: COMMERCIAL

## 2023-10-26 NOTE — TELEPHONE ENCOUNTER
MA notes per Pre Dialysis adherence form     FOR THE PAST THREE MONTHS:    0-Appt Adhrence  0-No show    No concerns with labs, care giving, transportation, or mental health    Scanned in pt's media     Eva Goodson  Abdominal Transplant MA

## 2023-10-27 ENCOUNTER — TELEPHONE (OUTPATIENT)
Dept: TRANSPLANT | Facility: CLINIC | Age: 58
End: 2023-10-27
Payer: COMMERCIAL

## 2023-10-27 ENCOUNTER — COMMITTEE REVIEW (OUTPATIENT)
Dept: TRANSPLANT | Facility: CLINIC | Age: 58
End: 2023-10-27
Payer: COMMERCIAL

## 2023-10-27 NOTE — COMMITTEE REVIEW
Native Organ Dx: Polycystic Kidneys      SELECTION COMMITTEE NOTE    Kenneth Phillip was presented at selection committee on 10/27/2023.  Patient met selection criteria for kidney transplant related to CKD, Stage 5 due to   Polycystic Kidneys.  No absolute contraindications to transplant at this time.  Patient will be placed on the cadaveric wait list pending  clarification from cardiology in regarding patient stopping anticoagulation (Xarelto) , CT review by surgeon to determine amount of weight loss needed for surgery (PKD)  and final financial approval from insurance company.  Patient will return to clinic for routine appointment in 1 year(s). Patient does meet criteria for High KDPI kidney offer. Patient does meet HCV+ donor offer. Patient does not meet criteria for dual/enbloc, due to weight. Planned immunosuppression Thymo.    Spoken to Lauren with Dr. Man, she stated that they have a telephone note stating that patient stop his Xarelto due to bleeding gums and bruising. Patient is aware of stroke risk associated with stopping it. She will clarify with Dr. Man to make sure that he's aware since he cleared the patient for transplant.    Attempted call to patient, per pt's wife, pt was in the woods. She will have him call me.      Note written by Serina Mcbride RN    ===============================================    I was present at the meeting and attest to the decision of the committee.    Nikita Dela Cruz  10/27/2023

## 2023-10-27 NOTE — LETTER
October 27, 2023    Sidney Broussard MD  [address] [place] [address]  Phone: [phone]  Fax: [phone]     Dear Dr. Broussard:    Patient: Kenneth Phillip   MR Number: [id number]   YOB: 1965     Your patient, Kenneth Phillip, was recently discussed at the [facility] Selection Committee.  I am happy to inform you that Kenneth has been approved for transplantation pending  clarification from patient's cardiology in regarding patient stopping anticoagulation (Xarelto)  and CT image review by our transplant surgeons to determine amount of weight loss needed for surgery .  He has met selection criteria for a kidney transplant related to CKD stage 5 secondary to primary diagnosis of Polycystic Kidneys. Your patient will be placed on the cadaveric wait list pending final financial approval from insurance company.     We appreciate your confidence in allowing us to participate in your patients care.      If you have any questions or concerns, please do not hesitate to contact me.    Sincerely,      Macy Nevarez MD  Medical Director, Kidney & Kidney/Pancreas Transplantation      CC:  Kenneth Phillip (patient)

## 2023-10-27 NOTE — TELEPHONE ENCOUNTER
Patient informed of committee's decision. All questions were answered and patient voiced understanding.   ----- Message from Lesly Szymanski MA sent at 10/27/2023  1:19 PM CDT -----  Regarding: FW: return call  Contact: Kenneth    ----- Message -----  From: Raquel Barrera  Sent: 10/27/2023  12:23 PM CDT  To: [facility] Pre-Kidney Transplant Non-Clinical  Subject: return call                                      Caller:Kenneth        Returning call to: Serina        Caller can be reached @: [phone] (home)      [phone] ( mobile)    Pt staes he believe the call is to see if some info has been received by him that he sent in the mail. Please advise.

## 2023-11-09 ENCOUNTER — EPISODE CHANGES (OUTPATIENT)
Dept: TRANSPLANT | Facility: CLINIC | Age: 58
End: 2023-11-09

## 2023-11-10 ENCOUNTER — COMMITTEE REVIEW (OUTPATIENT)
Dept: TRANSPLANT | Facility: CLINIC | Age: 58
End: 2023-11-10
Payer: COMMERCIAL

## 2023-11-10 DIAGNOSIS — Z76.82 ORGAN TRANSPLANT CANDIDATE: Primary | ICD-10-CM

## 2023-11-10 NOTE — COMMITTEE REVIEW
Native Organ Dx: Polycystic Kidneys      Outside CT reviewed from 11/18/21, patient will need up to date CT scan to assess for vascular calcifications and space from a PKD standpoint. Will assess if pt needs to lose weight once CT reviewed.   Clarification from Dr. Man, cardiology, in regarding patient stopping anticoagulation (Xarelto). Patient is still cleared from his standpoint. Reviewed by committee. Ok per committee.     Attempted call to patient, unable to leave a vm due to calls being screens per recording.     Note written by Serina Mcbride RN    ===============================================    I was present at the meeting and attest to the general consensus of the committee.   Kerwin Shea Jr.

## 2023-11-29 ENCOUNTER — TELEPHONE (OUTPATIENT)
Dept: TRANSPLANT | Facility: CLINIC | Age: 58
End: 2023-11-29
Payer: COMMERCIAL

## 2023-11-30 ENCOUNTER — TELEPHONE (OUTPATIENT)
Dept: TRANSPLANT | Facility: CLINIC | Age: 58
End: 2023-11-30
Payer: COMMERCIAL

## 2023-12-05 ENCOUNTER — TELEPHONE (OUTPATIENT)
Dept: TRANSPLANT | Facility: CLINIC | Age: 58
End: 2023-12-05
Payer: COMMERCIAL

## 2023-12-05 NOTE — TELEPHONE ENCOUNTER
----- Message from Devan Ren sent at 2023  1:13 PM CST -----  Regarding: Status Update  Good Afternoon,    I'm checking on the status of Mr. Cooper viktor clinicals. He was approved pending in selection on 10/27/2023. His evaluation auth will  on 2023 and I will need to know how to proceed in matter. Please advise if eval auth extension or listing auth will be needed?    Thanks,    Devan BERUMEN

## 2023-12-13 ENCOUNTER — HOSPITAL ENCOUNTER (OUTPATIENT)
Dept: RADIOLOGY | Facility: HOSPITAL | Age: 58
Discharge: HOME | End: 2023-12-13
Attending: NURSE PRACTITIONER
Payer: COMMERCIAL

## 2023-12-13 DIAGNOSIS — Z76.82 ORGAN TRANSPLANT CANDIDATE: ICD-10-CM

## 2023-12-13 PROCEDURE — 74176 CT ABDOMEN PELVIS WITHOUT CONTRAST: ICD-10-PCS | Mod: 26,TXP,, | Performed by: RADIOLOGY

## 2023-12-13 PROCEDURE — 74176 CT ABD & PELVIS W/O CONTRAST: CPT | Mod: 26,TXP,, | Performed by: RADIOLOGY

## 2023-12-13 PROCEDURE — 74176 CT ABD & PELVIS W/O CONTRAST: CPT | Mod: TC,TXP

## 2024-01-18 ENCOUNTER — TELEPHONE (OUTPATIENT)
Dept: TRANSPLANT | Facility: CLINIC | Age: 59
End: 2024-01-18
Payer: COMMERCIAL

## 2024-01-18 NOTE — TELEPHONE ENCOUNTER
----- Message from Levon Maciel sent at 1/18/2024  2:14 PM CST -----  Regarding: Consult/Advisory  Contact: Kenneth Phillip  Consult/Advisory    Name Of Caller: Kenneth Phillip       Contact Preference: [phone] (home)      Nature of call: Patient calling to speak to staff for follow up as he had CT scan and has never heard anything back after committee review decision. Requesting a call back.

## 2024-03-05 ENCOUNTER — TELEPHONE (OUTPATIENT)
Dept: TRANSPLANT | Facility: CLINIC | Age: 59
End: 2024-03-05
Payer: COMMERCIAL

## 2024-03-05 NOTE — TELEPHONE ENCOUNTER
Patient i    ----- Message from Jason L[place]y sent at 3/5/2024  1:33 PM CST -----  Regarding: call back  Pt call to speak with coordinator in regards to transplant status requesting call back    Call

## 2024-03-05 NOTE — TELEPHONE ENCOUNTER
Patient informed that I'm awaiting financial clearance from his insurance before I can list him. Follow up email sent to out [facility] for an update.

## 2024-03-12 ENCOUNTER — TELEPHONE (OUTPATIENT)
Dept: TRANSPLANT | Facility: CLINIC | Age: 59
End: 2024-03-12
Payer: COMMERCIAL

## 2024-03-12 DIAGNOSIS — Z76.82 ORGAN TRANSPLANT CANDIDATE: Primary | ICD-10-CM

## 2024-03-12 NOTE — LETTER
2024    Kenneth Phillip  [address] [place] [address]    Dear eKnneth Phillip:  MRN: [id number]    Congratulations! On 3/12/2024, you were placed on  the waiting list for a  donor transplant.    Your candidacy for kidney transplant is based on the following criteria: CKD stage 5 due to Polycystic Kidneys.    Your transplant coordinator while on the waiting list is Ella Hemphill RN. They can be reached at [phone] or [phone] with any questions.      What to do now?    Ask your living donors to begin testing   Share our screening website with anyone interested: [email or web address]  Make sure donors have your name and date of birth  You will get transplanted much faster if you have a living donor    Have your blood sent to our Transplant Lab every month  If you are on dialysis - our Transplant Lab will work with your dialysis unit to send your blood every month  If you are not on dialysis   If you live near an [facility] lab, we will schedule you to have blood drawn every month  If you do not live near an [facility] lab, you will be sent blood kits in the mail. You will need to take a kit to your local lab or doctor to have your blood drawn every month and mail to the Transplant Lab.     Call us with ANY CHANGES  Phone numbers - we must be able to reach you anytime of the day or night when a kidney is available  Address  Insurance coverage  Dialysis unit or kidney doctor  Dao: if you have surgery, stay in the hospital, have to get blood, or have an infection    Review your Kidney/Kidney-Pancreas Transplant Guide   This will give you detailed information about what happens when  you are on the waiting list   you are called when a kidney is available    The [facility] has a transplant surgeon and physician available 365 days a year, 24 hours a day to coordinate organ acceptance, procurement, surgical placement and to address urgent patient care issues.  You  will be notified in writing of any changes to our Transplant Centers staffing plan that would impact your ability to receive a transplant.        Attached is a letter from the [employer] Organ Sharing (UNOS). It describes the services and information offered to patients by [employer] and the Organ Procurement and Transplant Network. We look forward to working with you while on the waiting list.     We would like to inform you of an important OPTN (Organ Procurement and Transplant Network) Policy change that may affect the waiting time for some candidates on our waiting list.     Waiting time is important in identifying who receives offers for kidneys. A long wait time may increase your chance of getting an offer. Wait time is based on a test called eGFR that tells how well your kidneys are working. Wait time could also be based on how long you have been on dialysis. Government and health officials have changed the way this test is used. Before this year, hospitals used an eGFR that would include your race. For Black or  Americans, this eGFR could have shown that their kidneys were working better than they were.    Because of this change, we are looking at everyones record and assessing waiting time for people who are eligible. We will be reviewing everyones medical records and will contact you if you are eligible.     Who can I talk to if I have a question?  You can contact us if you have questions or send a message through [facility].     Please give us time to answer your questions. We are working on this for many patients.    How can I learn more about eGFR and this policy change?  Go to OPTN website > Patients > Kidney > FAQ: Understanding race-neutral eGFR calculations  Full URL: https://optn.transplant.hrsa.gov/patients/by-organ/kidney/understanding-the-proposal-to-require-race-neutral-egfr-calculations/  Call the Organ Procurement and Transplantation Network (OPTN) toll-free patient services line  at 7-072-369-0401    Congratulations,    Your Transplant Partner  [facility]   [facility] [place] [address]  [phone]  lh/enclosure    CC:  Sidney Broussard MD                                             The Organ Procurement and Transplantation Network   Toll-free patient services line: [phone]  Your resource for organ transplant information      Staffed 8:30 am - 5:00 pm ET Monday - Friday   Leave a message 24/7 to receive a call back    The Organ Procurement and Transplantation Network (OPTN) is the national transplant system. It makes the policies that decide how donated organs are matched to patients waiting for a transplant. The OPTN:    Makes sure donated organs get matched to people on the transplant waiting list  Tells people about the donation and transplant processes  Makes sure that the public knows about the need for more organ and tissue donations    The OPTN has a free patient services line that you can call to:  Get more information about:   o Organ donation and organ transplants   o Donation and transplant policies  Get an information kit with:   o A list of transplant hospitals   o Waiting list information  Talk about any questions you may have about your transplant hospital or organ procurement organization. The staff will do their best to help you or point you to others who may help.  Find out how you can volunteer with the OPTN and help shape transplant policy    The patient services line number is: 7-650-745-0670    Patient services line staff CANNOT answer questions about your own medical care, including:  Waiting list status  Test results  Medical records  You will need to call your transplant hospital for this information.    The following websites have more information about transplantation and donation:  OPTN: https://optn.transplant.[facility].gov/  For potential living donors and transplant recipients:   o Living with transplant:  https://www.transplantliving.org/   o Living donation process: https://optn.transplant.hrsa.gov/living-donation/     o Financial assistance: https://www.livingdonorassistance.org/  Transplantation data: https://www.srtr.org/  Organ donation: https://www.organdonor.gov/    Volunteer with the OPTN: https://optn.transplant.hrsa.gov/get-involved

## 2024-03-12 NOTE — TELEPHONE ENCOUNTER
"  KIDNEY WAIT LISTING NOTE    Date of Financial clearance to list: 3/7/24    SSN/[facility]:     Organ: Kidney    Last Name: Marjan  First Name: Kenneth    : 1965       Gender: male        MRN#: [id number]                                   State of Permanent Residence: [address] [place] [address] [place] [address]    Ethnicity: Not  or /a   Race:      White    CLINICAL INFORMATION   Candidate Medical Urgency Status: Active (1)  Number of Previous Kidney Transplants: 0  Number of Previous Solid Organ Transplants:0   Did you enter number of previous kidney or other solid organ transplants? Yes  Is this Candidate a Prior Living Donor: No  (If yes, please generate letter to [employer] with patient's date of donation, recipient SSN, signed by Surgical Director after patient is listed in order to receive priority points).      ABO  ABO Blood Group:   A NEG     ABO Confirmation: (THESE DATES MUST BE PRIOR TO THE LIST DATE AND SUPPORTED BY SEPARATE LAB REPORTS)    Internal Results    Lab Results   Component Value Date    GROUPTRH A NEG 2023     No results found for: "ABO"    External Results    ABO Date 1:    ABO Date 2  Are either of these ABO results based on External Labs? No  (If Yes, STOP and go to source document in Media Tab for verification).    VITALS  Height:  5" 7.8"  Weight: 257 lbs   (Use height from Transplant clinic visits only).  Did you enter height/weight? Yes    HLA    Class I:  Lab Results   Component Value Date    VURS5EM 1 2023    NTNZ3GH 3 2023    CKIF6TH 65 2023    IFMT4JQ 38 2023    CCHSX8IM 6 2023    TUNOP0JW 4 2023    ZETZJ3WX 6 2023    KFLSP7GM 8 2023       Class II:  Lab Results   Component Value Date    ZDOGSB30JB 17 2023    YYAVGJ07BQ 11 2023    FEHHEY621TS 52 2023    WSVARH2866 XX 2023    KJHSB0FA 2 2023    YIZWO9NB 7 2023       Tested for HLA Antibodies: Yes, no antibodies detected     If result is " ""Positive" antibodies are detected     If result is "Negative or questionable" no antibodies detected    No results found for: "CIPRAS", "CIIPRAS"    DIALYSIS INFORMATION  Is patient Pre-Dialysis: Yes     GFR Information  Report GFR being used as the criteria for placement on the kidney list. If not, leave blank  GFR < or = 20 ml/min? Yes  If Yes, Specify value  _13.5__   ml/min     Initial date GFR became 20 or less: 9/27/23  Is GFR obtained from an Outside lab Result? No  (If YES verify with source document scanned into media)    If patient on Dialysis:    Is candidate currently on dialysis for ESRD? No  If Yes,  Date Chronic Dialysis Started:   (Not currently on dialysis)  (verify with source document in Media Tab)   Dialysis Unit Name: Pre Dialysis [facility] draw                        Physician Name:  Dr. Macy Powell  NPI#: [id number]    DIABETES INFORMATION  Primary Native Kidney Diagnosis: Polycystic Kidneys  C-Peptide Value - No results found for: "CPEPTIDE"  Current Diabetes Status: None    FOR NON-KIDNEY DEPARTMENT USE ONLY:  Additional Organs Registered? none    Maximum Acceptable Number of HLA Mismatches  ABDR:     6      (0-6)               AB:               (0-4)  ADR:   _____  (0-4)              BDR: _____ (0-4)  A:        _____  (0-2)              B:      _____ (0-2)          DR: ______ (0-2)    Will Recipient Accept?   Accept HBcAB Positive Organ:            Yes  Accept HBV NO Positive Organ:        No  Accept HCV Antibody Positive Organ: Yes   Accept HCV NO Positive Organ: Yes    Dual Kidney and En Bloc Opt In : No  Dual  Local:   No  Dual Import:   No  En Bloc Local:   No  En Bloc Import: No     Accept KDPI > 85: Single: No     Local: No     Import: No  Accept KDPI > 85: Dual: No     Local: No     Import: No    ### NURSE TO VERIFY CONSENT AND MAKE ANY NECESSARY CHANGES NEEDED IN UNET AT THE TIME OF VERIFICATION ###    Unacceptible Antigens  If yes, list     Lab Results   Component " Value Date    CIABCLM WEAK A11 09/27/2023    CIIAB Negative 09/27/2023       ### DO NOT LIST IF ANTIGEN VALUE WEAK ###    Hep B Vaccine series completed: no    Blood Type x2 was verified by myself and Keyana.  Blood type determination and reporting was completed according to the programs protocols and OPTN requirements.

## 2024-03-20 DIAGNOSIS — Z76.82 AWAITING ORGAN TRANSPLANT STATUS: Primary | ICD-10-CM

## 2024-05-03 PROCEDURE — 86832 HLA CLASS I HIGH DEFIN QUAL: CPT | Mod: TXP | Performed by: NURSE PRACTITIONER

## 2024-05-03 PROCEDURE — 86833 HLA CLASS II HIGH DEFIN QUAL: CPT | Mod: TXP | Performed by: NURSE PRACTITIONER

## 2024-05-10 ENCOUNTER — LAB VISIT (OUTPATIENT)
Dept: LAB | Facility: HOSPITAL | Age: 59
End: 2024-05-10
Payer: COMMERCIAL

## 2024-05-10 DIAGNOSIS — Z76.82 AWAITING ORGAN TRANSPLANT STATUS: ICD-10-CM

## 2024-05-21 LAB — HPRA INTERPRETATION: NORMAL

## 2024-05-31 DIAGNOSIS — Z76.82 ORGAN TRANSPLANT CANDIDATE: Primary | ICD-10-CM

## 2024-06-03 ENCOUNTER — TELEPHONE (OUTPATIENT)
Dept: TRANSPLANT | Facility: CLINIC | Age: 59
End: 2024-06-03
Payer: COMMERCIAL

## 2024-06-03 NOTE — LETTER
Ligia 3, 2024    Kenneth Phillip  [address]  [facility] [address]        Dear Kenneth Phillip:  MRN: [id number]  : 1965    You are scheduled on 2024 for follow up in the transplant clinic to update your records and evaluate your health status as you wait for a transplant.  It is very important that we ensure you are ready for your transplant.      Please make arrangements to be in our transplant clinic from 12:30 pm- 4 pm.  During this time you will watch an educational video and then be seen by our transplant , financial counselor, and advance practice provider.     If you have had a change in your medical history since your last visit, please call your transplant coordinator to ensure we have the medical records to review prior to your appointment.     Please bring the following with you to this appointment:  A Caregiver is REQUIRED  Bring ALL insurance information including medication card  Current list of medications  Copies of any outside medical records from the past year, such as: mammogram, pap smear, ultrasound, CAT scan, colonoscopy, cardiac angiogram or cardiac stress test    To reschedule your appointments please call [phone] or [phone] ([phone]). Please ask for the .      Failure to cancel in advance or arrive on time could result in a $50 cancellation fee.  Your transplant candidacy could be affected by no showing these appointments.  We look forward to seeing you.    Sincerely,    [facility] [place]  [facility] [place] [address]  [phone]

## 2024-06-05 PROCEDURE — 99001 SPECIMEN HANDLING PT-LAB: CPT | Mod: TXP | Performed by: NURSE PRACTITIONER

## 2024-06-11 ENCOUNTER — LAB VISIT (OUTPATIENT)
Dept: LAB | Facility: HOSPITAL | Age: 59
End: 2024-06-11
Payer: COMMERCIAL

## 2024-06-11 DIAGNOSIS — Z76.82 ORGAN TRANSPLANT CANDIDATE: ICD-10-CM

## 2024-06-15 LAB
HLA FREEZE AND HOLD INTERPRETATION: NORMAL
HLAFH COLLECTION DATE: NORMAL
HPRA INTERPRETATION: NORMAL

## 2024-07-10 PROCEDURE — 86832 HLA CLASS I HIGH DEFIN QUAL: CPT | Mod: TXP | Performed by: NURSE PRACTITIONER

## 2024-07-10 PROCEDURE — 86833 HLA CLASS II HIGH DEFIN QUAL: CPT | Mod: TXP | Performed by: NURSE PRACTITIONER

## 2024-07-22 ENCOUNTER — LAB VISIT (OUTPATIENT)
Dept: LAB | Facility: HOSPITAL | Age: 59
End: 2024-07-22
Payer: COMMERCIAL

## 2024-07-22 DIAGNOSIS — Z76.82 AWAITING ORGAN TRANSPLANT STATUS: ICD-10-CM

## 2024-07-26 LAB — HPRA INTERPRETATION: NORMAL

## 2024-08-05 ENCOUNTER — TELEPHONE (OUTPATIENT)
Dept: TRANSPLANT | Facility: CLINIC | Age: 59
End: 2024-08-05
Payer: COMMERCIAL

## 2024-08-06 PROCEDURE — 99001 SPECIMEN HANDLING PT-LAB: CPT | Mod: TXP | Performed by: NURSE PRACTITIONER

## 2024-08-14 ENCOUNTER — LAB VISIT (OUTPATIENT)
Dept: LAB | Facility: HOSPITAL | Age: 59
End: 2024-08-14
Payer: COMMERCIAL

## 2024-08-14 DIAGNOSIS — Z76.82 AWAITING ORGAN TRANSPLANT STATUS: ICD-10-CM

## 2024-08-15 ENCOUNTER — TELEPHONE (OUTPATIENT)
Dept: CARDIOLOGY | Facility: HOSPITAL | Age: 59
End: 2024-08-15
Payer: COMMERCIAL

## 2024-08-15 ENCOUNTER — TELEPHONE (OUTPATIENT)
Dept: TRANSPLANT | Facility: CLINIC | Age: 59
End: 2024-08-15
Payer: COMMERCIAL

## 2024-08-19 ENCOUNTER — HOSPITAL ENCOUNTER (OUTPATIENT)
Dept: CARDIOLOGY | Facility: HOSPITAL | Age: 59
Discharge: HOME | End: 2024-08-19
Attending: NURSE PRACTITIONER
Payer: COMMERCIAL

## 2024-08-19 VITALS
HEART RATE: 54 BPM | HEIGHT: 67 IN | SYSTOLIC BLOOD PRESSURE: 142 MMHG | BODY MASS INDEX: 40.34 KG/M2 | DIASTOLIC BLOOD PRESSURE: 84 MMHG | WEIGHT: 257 LBS | RESPIRATION RATE: 16 BRPM

## 2024-08-19 DIAGNOSIS — Z76.82 ORGAN TRANSPLANT CANDIDATE: ICD-10-CM

## 2024-08-19 LAB
CV STRESS BASE HR: 49 BPM
DIASTOLIC BLOOD PRESSURE: 88 MMHG
EJECTION FRACTION- HIGH: 59 %
END DIASTOLIC INDEX-HIGH: 155 ML/M2
END DIASTOLIC INDEX-LOW: 91 ML/M2
END SYSTOLIC INDEX-HIGH: 78 ML/M2
END SYSTOLIC INDEX-LOW: 40 ML/M2
NUC REST DIASTOLIC VOLUME INDEX: 95
NUC REST EJECTION FRACTION: 58
NUC REST SYSTOLIC VOLUME INDEX: 40
NUC STRESS DIASTOLIC VOLUME INDEX: 110
NUC STRESS EJECTION FRACTION: 55 %
NUC STRESS SYSTOLIC VOLUME INDEX: 50
OHS CV CPX 1 MINUTE RECOVERY HEART RATE: 60 BPM
OHS CV CPX 85 PERCENT MAX PREDICTED HEART RATE MALE: 135
OHS CV CPX MAX PREDICTED HEART RATE: 159
OHS CV CPX PATIENT IS FEMALE: 0
OHS CV CPX PATIENT IS MALE: 1
OHS CV CPX PEAK DIASTOLIC BLOOD PRESSURE: 87 MMHG
OHS CV CPX PEAK HEAR RATE: 51 BPM
OHS CV CPX PEAK RATE PRESSURE PRODUCT: 7344
OHS CV CPX PEAK SYSTOLIC BLOOD PRESSURE: 144 MMHG
OHS CV CPX PERCENT MAX PREDICTED HEART RATE ACHIEVED: 32
OHS CV CPX RATE PRESSURE PRODUCT PRESENTING: 7056
RETIRED EF AND QEF - SEE NOTES: 47 %
SYSTOLIC BLOOD PRESSURE: 144 MMHG

## 2024-08-19 PROCEDURE — 78452 HT MUSCLE IMAGE SPECT MULT: CPT | Mod: TXP

## 2024-08-19 PROCEDURE — A9502 TC99M TETROFOSMIN: HCPCS | Mod: TXP | Performed by: NURSE PRACTITIONER

## 2024-08-19 PROCEDURE — 63600175 PHARM REV CODE 636 W HCPCS: Mod: TXP | Performed by: NURSE PRACTITIONER

## 2024-08-19 RX ORDER — REGADENOSON 0.08 MG/ML
0.4 INJECTION, SOLUTION INTRAVENOUS
Status: COMPLETED | OUTPATIENT
Start: 2024-08-19 | End: 2024-08-19

## 2024-08-19 RX ORDER — AMINOPHYLLINE 25 MG/ML
75 INJECTION, SOLUTION INTRAVENOUS
Status: COMPLETED | OUTPATIENT
Start: 2024-08-19 | End: 2024-08-19

## 2024-08-19 RX ADMIN — TETROFOSMIN 30.8 MILLICURIE: 1.38 INJECTION, POWDER, LYOPHILIZED, FOR SOLUTION INTRAVENOUS at 08:08

## 2024-08-19 RX ADMIN — TETROFOSMIN 10.9 MILLICURIE: 1.38 INJECTION, POWDER, LYOPHILIZED, FOR SOLUTION INTRAVENOUS at 07:08

## 2024-08-19 RX ADMIN — REGADENOSON 0.4 MG: 0.08 INJECTION, SOLUTION INTRAVENOUS at 08:08

## 2024-08-19 RX ADMIN — AMINOPHYLLINE 75 MG: 25 INJECTION, SOLUTION INTRAVENOUS at 08:08

## 2024-08-20 ENCOUNTER — TELEPHONE (OUTPATIENT)
Dept: TRANSPLANT | Facility: CLINIC | Age: 59
End: 2024-08-20
Payer: COMMERCIAL

## 2024-08-20 NOTE — TELEPHONE ENCOUNTER
----- Message from Radha Barbosa NP sent at 8/20/2024  9:18 AM CDT -----    Perfusion Abnormality #1 - There is a moderate intensity, medium sized, reversible perfusion abnormality that is consistent with ischemia in the basal to distal inferior wall(s) in the typical distribution of the RCA territory.  ·  Perfusion Abnormality #2 - There is a small to medium sized, moderate intensity, reversible perfusion abnormality that is consistent with ischemia in the basal to distal anterior wall(s) in the typical distribution of the diagonal territory.  ·  Perfusion Abnormality #3 - There is a small to medium sized, moderate intensity, mostly reversible perfusion abnormality with some fixed areas in the basal to mid inferior and inferolateral wall(s) in the typical distribution of the LCX territory.      Needs Cardiology evaluation and clearance--- previously seen outside cardiology

## 2024-08-21 ENCOUNTER — TELEPHONE (OUTPATIENT)
Dept: TRANSPLANT | Facility: CLINIC | Age: 59
End: 2024-08-21
Payer: COMMERCIAL

## 2024-08-21 NOTE — TELEPHONE ENCOUNTER
I called patient's mobile yesterday and today and no answer with no VM set up. I called home phone today and spoke with patient's wife. Patient was not home. I informed her that his stress test on 8/19/2024 was abnormal. I faxed to his cardiologist for review and updated clearance. I also explained that he will be temporarily inactive until cleared by cardiology.she verbalized understanding and will have patient call me when he gets home if he has questions. She was informed that he will receive letter in mail regarding change in status to inactive.

## 2024-08-27 ENCOUNTER — OFFICE VISIT (OUTPATIENT)
Dept: TRANSPLANT | Facility: CLINIC | Age: 59
End: 2024-08-27
Payer: COMMERCIAL

## 2024-08-27 ENCOUNTER — HOSPITAL ENCOUNTER (OUTPATIENT)
Dept: RADIOLOGY | Facility: HOSPITAL | Age: 59
Discharge: HOME | End: 2024-08-27
Attending: NURSE PRACTITIONER
Payer: COMMERCIAL

## 2024-08-27 ENCOUNTER — HOSPITAL ENCOUNTER (OUTPATIENT)
Dept: CARDIOLOGY | Facility: HOSPITAL | Age: 59
Discharge: HOME | End: 2024-08-27
Attending: NURSE PRACTITIONER
Payer: COMMERCIAL

## 2024-08-27 VITALS
TEMPERATURE: 97 F | WEIGHT: 270.31 LBS | DIASTOLIC BLOOD PRESSURE: 91 MMHG | BODY MASS INDEX: 42.43 KG/M2 | SYSTOLIC BLOOD PRESSURE: 190 MMHG | HEART RATE: 54 BPM | OXYGEN SATURATION: 95 % | RESPIRATION RATE: 16 BRPM | HEIGHT: 67 IN

## 2024-08-27 DIAGNOSIS — I12.0 BENIGN HYPERTENSION WITH CKD (CHRONIC KIDNEY DISEASE) STAGE V: ICD-10-CM

## 2024-08-27 DIAGNOSIS — Z76.82 PATIENT ON WAITING LIST FOR KIDNEY TRANSPLANT: Primary | ICD-10-CM

## 2024-08-27 DIAGNOSIS — Z76.82 ORGAN TRANSPLANT CANDIDATE: ICD-10-CM

## 2024-08-27 DIAGNOSIS — N18.5 BENIGN HYPERTENSION WITH CKD (CHRONIC KIDNEY DISEASE) STAGE V: ICD-10-CM

## 2024-08-27 DIAGNOSIS — Q61.2 AUTOSOMAL DOMINANT POLYCYSTIC KIDNEY DISEASE: ICD-10-CM

## 2024-08-27 DIAGNOSIS — N25.81 SECONDARY HYPERPARATHYROIDISM: ICD-10-CM

## 2024-08-27 DIAGNOSIS — I48.91 ATRIAL FIBRILLATION, UNSPECIFIED TYPE: ICD-10-CM

## 2024-08-27 DIAGNOSIS — E66.01 CLASS 3 SEVERE OBESITY DUE TO EXCESS CALORIES WITH SERIOUS COMORBIDITY AND BODY MASS INDEX (BMI) OF 40.0 TO 44.9 IN ADULT: ICD-10-CM

## 2024-08-27 PROBLEM — E66.813 CLASS 3 SEVERE OBESITY DUE TO EXCESS CALORIES WITH SERIOUS COMORBIDITY AND BODY MASS INDEX (BMI) OF 40.0 TO 44.9 IN ADULT: Status: ACTIVE | Noted: 2023-09-27

## 2024-08-27 PROCEDURE — 76770 US EXAM ABDO BACK WALL COMP: CPT | Mod: TC,TXP

## 2024-08-27 PROCEDURE — 99215 OFFICE O/P EST HI 40 MIN: CPT | Mod: S$GLB,TXP,, | Performed by: NURSE PRACTITIONER

## 2024-08-27 PROCEDURE — 71046 X-RAY EXAM CHEST 2 VIEWS: CPT | Mod: TC,TXP

## 2024-08-27 PROCEDURE — 99999 PR PBB SHADOW E&M-EST. PATIENT-LVL IV: CPT | Mod: PBBFAC,TXP,, | Performed by: NURSE PRACTITIONER

## 2024-08-27 NOTE — PROGRESS NOTES
Kidney Transplant Recipient Reevalulation    Referring Physician: Sidney Broussard  Current Nephrologist: Sidney Broussard  Waitlist Status: inactive  Dialysis Start Date: (Not currently on dialysis)    Subjective:     CC:  Annual reassessment of kidney transplant candidacy.    HPI:  Mr. Phillip is a 59 y.o. year old White male with advanced kidney disease secondary to polycystic kidney disease.  He has been on the wait list for a kidney transplant at [facility] since 3/12/2024. Patient is currently pre-dialysis. He has   no access . Patient denies any recent hospitalizations or ED visits.    7/30/24 (CE)  eGFR            10 >59 mL/min/1.73 10 Low      Inactive since 8/21/24: d/t abnormal stress test on 8/19/24       LOV 9/7/23 in txp clinic      Fx assessment:  s/p right knee replacement 2023.  He continues walking  a few times per week and will walk  1 mile. He does not appear frail. Denies CARD or chest pain.     HX Afib   -reports Xarelto was stopped   ~ 1 year ago --d/t gums bleeding   On amiodarone    Cardiologist : Dr Man     Lab /diagnostic results /TXP [facility] reviewed      8/27/24 TTE:  scheduled this afternoon   8/27/24 renal US: scheduled this afternoon   8/27/24 CXR: needs to be rescheduled   9/27/23 PXR: Mild vascular calcifications   12/13/23 CTA/P WO: Few scattered atherosclerosis in the iliac arteries. A suitable landing site for transplant artery should be able to be found on either side.     Lexiscan 8/19/24 :  Abnormal myocardial perfusion scan.    There are three significant perfusion abnormalities.    Perfusion Abnormality #1 - There is a moderate intensity, medium sized, reversible perfusion abnormality that is consistent with ischemia in the basal to distal inferior wall(s) in the typical distribution of the RCA territory.    Perfusion Abnormality #2 - There is a small to medium sized, moderate intensity, reversible perfusion abnormality that is consistent with ischemia in the basal to  distal anterior wall(s) in the typical distribution of the diagonal territory.    Perfusion Abnormality #3 - There is a small to medium sized, moderate intensity, mostly reversible perfusion abnormality with some fixed areas in the basal to mid inferior and inferolateral wall(s) in the typical distribution of the LCX territory.    There are no other significant perfusion abnormalities.    The gated perfusion images showed an ejection fraction of 58% at rest. The gated perfusion images showed an ejection fraction of 55% post stress. Normal ejection fraction is greater than 47%.    There is regional hypokinetic wall motion at rest in the basal to mid lateral wall(s). There is regional hypokinetic wall motion during post-stress imaging in the basal to mid lateral wall(s) and basal to mid inferior wall(s).    LV cavity size is normal at rest and normal at post-stress.    The ECG portion of the study is negative for ischemia.    The patient reported no chest pain during the stress test.    There were no arrhythmias during stress.    There are no prior studies for comparison.      Weight gain   Body mass index is 42.33 kg/m².  Encourage lifestyle modifications: diet, exercise, weight loss   Needs to maintain acceptable BMI for txp/guidelines     PSA, Screen (ng/mL)   Date Value   08/27/2024 0.67     Past Medical History:   Diagnosis Date    Anemia     Atrial fibrillation     Disorder of kidney and ureter     GERD (gastroesophageal reflux disease)     Hypertension     Obesity     Polycystic kidney disease        Review of Systems   Constitutional:  Positive for fatigue. Negative for activity change, appetite change, chills, fever and unexpected weight change.   HENT:  Negative for congestion, facial swelling, postnasal drip, rhinorrhea, sinus pressure, sore throat and trouble swallowing.    Eyes:  Negative for pain, redness and visual disturbance (wears glasses).   Respiratory:  Positive for apnea (HX NATASHA). Negative for  "cough, chest tightness, shortness of breath and wheezing.    Cardiovascular:  Positive for palpitations (Afib: s/p cardiac ablation) and leg swelling. Negative for chest pain.   Gastrointestinal:  Positive for abdominal distention. Negative for abdominal pain, diarrhea, nausea and vomiting.        GERD   Genitourinary:  Negative for dysuria, flank pain and urgency.   Musculoskeletal:  Positive for arthralgias (s/p right knee replacement) and back pain (chronic). Negative for gait problem, neck pain and neck stiffness.   Skin:  Negative for rash.   Allergic/Immunologic: Negative for environmental allergies, food allergies and immunocompromised state.   Neurological:  Negative for dizziness, weakness, light-headedness and headaches.   Psychiatric/Behavioral:  Positive for sleep disturbance. Negative for agitation and confusion. The patient is not nervous/anxious.        Objective:   body mass index is 42.33 kg/m².  BP (!) 190/91 (BP Location: Right arm, Patient Position: Sitting, BP Method: Medium (Automatic))   Pulse (!) 54   Temp 97.3 °F (36.3 °C) (Temporal)   Resp 16   Ht 5' 7" (1.702 m)   Wt 122.6 kg (270 lb 4.5 oz)   SpO2 95%   BMI 42.33 kg/m²     Physical Exam  Constitutional:       Appearance: Normal appearance. He is well-developed.   HENT:      Head: Normocephalic.      Nose: Nose normal.   Eyes:      Conjunctiva/sclera: Conjunctivae normal.      Pupils: Pupils are equal, round, and reactive to light.   Cardiovascular:      Rate and Rhythm: Normal rate and regular rhythm.      Heart sounds: Normal heart sounds.   Pulmonary:      Effort: Pulmonary effort is normal.      Breath sounds: Normal breath sounds.   Abdominal:      General: Bowel sounds are normal. There is distension.      Palpations: Abdomen is soft.   Musculoskeletal:      Cervical back: Normal range of motion and neck supple.      Right lower leg: Edema (+2) present.      Left lower leg: Edema (+2) present.   Skin:     General: Skin is warm " "and dry.   Neurological:      Mental Status: He is alert and oriented to person, place, and time.   Psychiatric:         Behavior: Behavior normal.         Labs:  Lab Results   Component Value Date    WBC 8.17 09/27/2023    HGB 12.7 (L) 09/27/2023    HCT 40.6 09/27/2023     09/27/2023    K 4.7 09/27/2023     (H) 09/27/2023    CO2 22 (L) 09/27/2023    BUN 46 (H) 09/27/2023    CREATININE 4.7 (H) 09/27/2023    EGFRNORACEVR 13.5 (A) 09/27/2023    CALCIUM 9.1 09/27/2023    PHOS 4.9 (H) 09/27/2023    ALBUMIN 4.3 09/27/2023    AST 12 09/27/2023    ALT 16 09/27/2023     (H) 07/30/2024       No results found for: "PREALBUMIN", "BILIRUBINUA", "GGT", "AMYLASE", "LIPASE", "PROTEINUA", "NITRITE", "RBCUA", "WBCUA"    No results found for: "HLAABCTYPE"    Lab Results   Component Value Date    CPRA 0 05/03/2024    CIABCLM Weak A11 05/03/2024    CIIAB Negative 05/03/2024       Labs were reviewed with the patient.    Pre-transplant Workup:   Reviewed with the patient.    Assessment:     1. Patient on waiting list for kidney transplant    2. Benign hypertension with CKD (chronic kidney disease) stage V    3. Autosomal dominant polycystic kidney disease    4. Atrial fibrillation, unspecified type    5. Class 3 severe obesity due to excess calories with serious comorbidity and body mass index (BMI) of 40.0 to 44.9 in adult    6. Secondary hyperparathyroidism        Plan:   No longer on Xarelto d/t gums bleeding   On amiodarone     Weight gain   Body mass index is 42.33 kg/m².  Discussed and Encourage lifestyle modifications: diet, exercise, weight loss   Needs to maintain acceptable BMI for txp/guidelines     Inactive since 8/21/24: d/t abnormal stress test on 8/19/24; needs cardiology f/u and clearance     8/27/24 TTE:  scheduled this afternoon   8/27/24 renal US: scheduled this afternoon   8/27/24 CXR: needs to be rescheduled       Transplant Candidacy:   Mr. Phillip is an unacceptable kidney transplant candidate " d/t BMI > center guidelines and ABN stress test.  Meets center eligibility for accepting HCV+ donor offer - Yes.  Patient educated on HCV+ donors. Kenneth is willing  to accept HCV+ donor offer -  Yes   Patient is a candidate for KDPI > 85 kidney donor offer - No d/t weight  He has experienced health changes that warrant further investigation.  Patient will be placed in an inactive status at present.    Patient advised that it is recommended that all transplant candidates, and their close contacts and household members receive Covid vaccination.    Carly Bates NP       Follow-up:   In addition to the tests noted in the plan, Mr. Phillip will continue to have reevaluation as per the standing pre-kidney transplant protocol:  Monthly blood for PRA  Annual return to clinic, except HIV positive, > 65 years of age, or pancreas transplant candidates who will be scheduled to see transplant every 6 months while in pre-transplant phase  Annual re-testing: CXR, EKG, yearly mammograms for women over 40 and PSA for males over 40, cardiology follow-up as recommended by initial cardiology pre-transplant evaluation  Renal ultrasound every 2 years  Baseline colonoscopy after age 50 and repeated as recommended    UNOS Patient Status  Functional Status: 80% - Normal activity with effort: some symptoms of disease  Physical Capacity: No Limitations

## 2024-08-27 NOTE — LETTER
August 29, 2024        Sidney Broussard  [address]  [facility] [address]  Phone: [phone]  Fax: [phone]             [facility]  [address]  [facility] [address]  Phone: [phone]   Patient: Kenneth Phillip   MR Number: [id number]   YOB: 1965   Date of Visit: 8/27/2024       Dear Dr. Sidney Broussard    Thank you for referring Kenneth Phillip to me for evaluation. Attached you will find relevant portions of my assessment and plan of care.    If you have questions, please do not hesitate to call me. I look forward to following Kenneth Phillip along with you.    Sincerely,    Carly Bates, NP    Enclosure    If you would like to receive this communication electronically, please contact [email or web address] or [phone] to request [employer] access.    [employer] is a tool which provides read-only access to select patient information with whom you have a relationship. Its easy to use and provides real time access to review your patients record including encounter summaries, notes, results, and demographic information.    If you feel you have received this communication in error or would no longer like to receive these types of communications, please e-mail [email or web address]

## 2024-08-27 NOTE — PROGRESS NOTES
WAITLIST  PATIENT EDUCATION NOTE    Mr. Kenneth Phillip was seen in pre-kidney transplant clinic for evaluation for kidney, kidney/pancreas or pancreas only transplant.  The patient attended a group education session that discussed/reviewed the following aspects of transplantation: evaluation and selection committee process, UNOS waitlist management/multiple listings, types of organs offered (KDPI < 85%, KDPI > 85%, PHS risk, DCD, HCV+, HIV+ for HIV+ recipients and enbloc/dual), financial aspects, surgical procedures, dietary instruction pre- and post-transplant, health maintenance pre- and post-transplant, post-transplant hospitalization and outpatient follow-up, potential to participate in a research protocol, and medication management and side effects.  A question and answer session was provided after the presentation.    The patient was seen by all members of the multi-disciplinary team to include: Nephrologist/PA, Surgeon, , Transplant Coordinator, , Pharmacist and Dietician (if applicable).    The patient reviewed and signed all consents for evaluation which were witnessed and sent to scanning into the EPIC chart.    The patient was given an education book and plan for further evaluation based on his individual assessment.      Reviewed program requirement for complete COVID vaccination with documentation prior to listing. COVID education information reviewed with patient. Patient encouraged to be up to date on all vaccinations.     The patient was encouraged to call with any questions or concerns.

## 2024-08-28 ENCOUNTER — TELEPHONE (OUTPATIENT)
Dept: TRANSPLANT | Facility: CLINIC | Age: 59
End: 2024-08-28
Payer: COMMERCIAL

## 2024-08-28 NOTE — PROGRESS NOTES
YEARLY LIST MANAGEMENT NOTE    Kenneth Phillip's kidney transplant listing status reviewed.  Patient is due for follow-up appointments on 8/2025.  Appointments will be scheduled per protocol.  Patient currently inactive due to abnormal stress and now has 42.33 BMI

## 2024-09-05 ENCOUNTER — TELEPHONE (OUTPATIENT)
Dept: TRANSPLANT | Facility: CLINIC | Age: 59
End: 2024-09-05
Payer: COMMERCIAL

## 2024-09-05 NOTE — TELEPHONE ENCOUNTER
I called patient to follow-up on his Cardiology clearance. Patient stated that he has appt with dr. Francis from Dr. Montero' office on 9/17/2024. I will follow-up after heart cath. Patient verbalized understanding.

## 2024-09-17 ENCOUNTER — TELEPHONE (OUTPATIENT)
Dept: TRANSPLANT | Facility: CLINIC | Age: 59
End: 2024-09-17
Payer: COMMERCIAL

## 2024-09-17 PROCEDURE — 86832 HLA CLASS I HIGH DEFIN QUAL: CPT | Mod: TXP | Performed by: NURSE PRACTITIONER

## 2024-09-17 PROCEDURE — 86833 HLA CLASS II HIGH DEFIN QUAL: CPT | Mod: TXP | Performed by: NURSE PRACTITIONER

## 2024-09-17 NOTE — TELEPHONE ENCOUNTER
"I returned call and spoke with patient's wife. Patient was seen today and his heart cath was ok per pt wife. He has an echo scheduled on 10/17/2024. Once completed patient's cardiologist will send reports and clearance per patient wife.  I will follow-up on 10/17/2024.       ----- Message from Gaby Sanchez RN sent at 9/16/2024 12:32 PM CDT -----  Regarding: FW: call back    ----- Message -----  From: Jason Langley  Sent: 9/16/2024  10:58 AM CDT  To: Kidney Waitlisted Coordinator  Subject: call back                                        Consult/Advisory:        Name Of Caller: Self     Contact Preference?:[phone]     What is the nature of the call?: Calling to speak w/ Ella in regards to his  heart cath pt states he was told by his doctor that nothing was wrong with his heart requesting call back about        Additional Notes:  "Thank you for all that you do for our patients"  "

## 2024-09-26 ENCOUNTER — LAB VISIT (OUTPATIENT)
Dept: LAB | Facility: HOSPITAL | Age: 59
End: 2024-09-26
Payer: COMMERCIAL

## 2024-09-26 DIAGNOSIS — Z76.82 AWAITING ORGAN TRANSPLANT STATUS: ICD-10-CM

## 2024-10-04 LAB — HPRA INTERPRETATION: NORMAL

## 2024-10-08 PROCEDURE — 99001 SPECIMEN HANDLING PT-LAB: CPT | Mod: TXP | Performed by: NURSE PRACTITIONER

## 2024-10-17 ENCOUNTER — LAB VISIT (OUTPATIENT)
Dept: LAB | Facility: HOSPITAL | Age: 59
End: 2024-10-17
Payer: COMMERCIAL

## 2024-10-17 DIAGNOSIS — Z76.82 AWAITING ORGAN TRANSPLANT STATUS: ICD-10-CM

## 2024-10-22 ENCOUNTER — TELEPHONE (OUTPATIENT)
Dept: TRANSPLANT | Facility: CLINIC | Age: 59
End: 2024-10-22
Payer: COMMERCIAL

## 2024-10-22 NOTE — TELEPHONE ENCOUNTER
I called Dr. Montero office to request progress notes, clearance, angiogram report and echo report for clearance.  I was asked to fax DEYANIRA to [phone]. Faxed on 10/22/2024 medical records.

## 2024-11-07 PROCEDURE — 86833 HLA CLASS II HIGH DEFIN QUAL: CPT | Mod: TXP | Performed by: NURSE PRACTITIONER

## 2024-11-07 PROCEDURE — 86832 HLA CLASS I HIGH DEFIN QUAL: CPT | Mod: TXP | Performed by: NURSE PRACTITIONER

## 2024-11-11 ENCOUNTER — TELEPHONE (OUTPATIENT)
Dept: TRANSPLANT | Facility: CLINIC | Age: 59
End: 2024-11-11
Payer: COMMERCIAL

## 2024-11-13 ENCOUNTER — LAB VISIT (OUTPATIENT)
Dept: LAB | Facility: HOSPITAL | Age: 59
End: 2024-11-13
Payer: COMMERCIAL

## 2024-11-13 DIAGNOSIS — Z76.82 AWAITING ORGAN TRANSPLANT STATUS: ICD-10-CM

## 2024-11-20 LAB — HPRA INTERPRETATION: NORMAL

## 2024-12-10 PROCEDURE — 99001 SPECIMEN HANDLING PT-LAB: CPT | Mod: TXP | Performed by: NURSE PRACTITIONER

## 2024-12-16 ENCOUNTER — LAB VISIT (OUTPATIENT)
Dept: LAB | Facility: HOSPITAL | Age: 59
End: 2024-12-16
Payer: COMMERCIAL

## 2024-12-16 DIAGNOSIS — Z76.82 AWAITING ORGAN TRANSPLANT STATUS: ICD-10-CM

## 2025-01-08 PROCEDURE — 86832 HLA CLASS I HIGH DEFIN QUAL: CPT | Mod: TXP | Performed by: NURSE PRACTITIONER

## 2025-01-08 PROCEDURE — 86833 HLA CLASS II HIGH DEFIN QUAL: CPT | Mod: TXP | Performed by: NURSE PRACTITIONER

## 2025-01-14 ENCOUNTER — TELEPHONE (OUTPATIENT)
Dept: TRANSPLANT | Facility: CLINIC | Age: 60
End: 2025-01-14
Payer: COMMERCIAL

## 2025-01-14 ENCOUNTER — LAB VISIT (OUTPATIENT)
Dept: LAB | Facility: HOSPITAL | Age: 60
End: 2025-01-14
Payer: COMMERCIAL

## 2025-01-14 DIAGNOSIS — Z76.82 ORGAN TRANSPLANT CANDIDATE: ICD-10-CM

## 2025-01-25 LAB — HPRA INTERPRETATION: NORMAL

## 2025-01-27 ENCOUNTER — TELEPHONE (OUTPATIENT)
Dept: TRANSPLANT | Facility: CLINIC | Age: 60
End: 2025-01-27
Payer: COMMERCIAL

## 2025-01-27 NOTE — TELEPHONE ENCOUNTER
I called and left requesting a return call to discuss if patient completed his antibiotics and assess for reactivation on the list.  Awaiting return call

## 2025-01-29 ENCOUNTER — TELEPHONE (OUTPATIENT)
Dept: TRANSPLANT | Facility: CLINIC | Age: 60
End: 2025-01-29
Payer: COMMERCIAL

## 2025-01-29 NOTE — TELEPHONE ENCOUNTER
"I returned patient's call and spoke with patient and his wife. Patient informed me that [place] [facility] His kidney infection did not resolve and he was admitted to local hospital with sepsis. He was in ICU and was transferred to the floor yesterday. Patient encouraged to keep me notified. He also informed me that he started dialysis while in the hospital.  Patient and his wife asked questions and all quesitons answered.  Patient's wife will keep me updated.    ----- Message from Ella Hemphill sent at 1/29/2025 10:52 AM CST -----  Regarding: FW: call back    ----- Message -----  From: Jason Langley  Sent: 1/29/2025  10:40 AM CST  To: Kidney Waitlisted Coordinator  Subject: call back                                        Consult/Advisory:        Name Of Caller: Self     Contact Preference?:[phone]     What is the nature of the call?: Calling to speak w/ Ella in regards to being in the hospital requesting call back     Additional Notes:  "Thank you for all that you do for our patients"  "

## 2025-02-01 NOTE — PROVIDER TRANSFER
(Physician in Lead of Transfers)  Outside Transfer Acceptance Note / Regional Referral Center    Upon patient arrival to floor, please send SecureChat to [facility] or call extension [phone] (if no answer, do NOT leave a callback number after the beep, rather please send a SecureChat to [facility]), for Hospital Medicine admit team assignment and for additional admit orders for the patient.  Do not page the attending physician associated with the patient on arrival (this physician may not be on duty at the time of arrival).  Rather, always send a SecureChat to [facility] or call [phone] to reach the triage physician for orders and team assignment.    Referring facility: [facility]   Referring provider: DORIS CHAVEZ  Accepting facility: [facility]  Accepting provider: LAISHA YOU  Reason for transfer: Higher Level of Care   Transfer diagnosis: Emphysematous, pyelonephritis left kidney, possibly needing nephrectomy  Transfer specialty requested: Urology  Transfer specialty notified: Yes  Transfer level: NUMBER 1-5: 2  Bed type requested: stepdown  Isolation status: No active isolations   Admission class or status: IP- Inpatient      Narrative     59-year-old male with a history of ADPKD, episodes of MARIZA superimposed on CKD (baseline creatinine appears to be around 6-7), hypertension, atrial fibrillation (on Xarelto), and UTIs admitted to [facility] January 25 with hiccups, nausea with vomiting, dizziness, poor oral intake, dysuria, hematuria, and flank pain.  Flank pain was mostly on the left.  He had no fever chills, no chest pain or dyspnea, and no evidence of GI bleeding.  Chest x-ray and CT head had no acute abnormalities.  Labs were concerning for leukocytosis, anemia, hyponatremia, and worsened renal function.  CT of the abdomen and pelvis noted emphysematous pyelonephritis on the left with multiple areas of gas scattered about the renal cortex and  within renal cysts.  There was also perinephric edema and emphysematous cystitis.  Blood and urine cultures were collected, and he was admitted and placed on antibiotics.  He received normal saline for the hyponatremia.  Xarelto was held with concern for hematuria.  He was seen by Nephrology who noted patient has end-stage renal disease and was advised to initiate dialysis previously but he was hesitant.  He was initiated on bicarbonate infusion with his metabolic acidosis.  They again discussed with the patient initiation of hemodialysis.  He was seen by Urology who recommended treatment of UTI with no specific  intervention at that time.  Overnight on January 26-27, he developed worsening hypotension with tachycardia and fever.  He was transferred to the ICU.  Hemoglobin on January 26 was noted to be low, and he received packed red blood cells.  He was noted to be lethargic in the ICU, and additional IV fluid was ordered.  He required pressors.  Course was complicated by metabolic encephalopathy, but mentation has improved.  Hemodynamic status has improved, and patient transitioned out of the ICU to a [facility] bed.  Patient agreed to initiation of hemodialysis, and he has been undergoing hemodialysis through a temporary IJ catheter.  Blood and urine cultures from January 25 grew E coli that was sensitive to ceftriaxone.  He remains on ceftriaxone, and repeat blood cultures from January 27 and January 31 have no growth to date.  Repeat CT of the abdomen and pelvis on January 31 showed persistent emphysematous pyelonephritis.  Referring team is requesting transfer to Hospital Medicine at [facility] for Urology evaluation for potential nephrectomy.  Referring team spoke with Urology at [facility] and they are available for consultation.  Uncertain at present if surgical intervention will be needed.  Will plan transfer to Hospital Medicine at [facility] for further treatment.    CT abdomen and  pelvis showed emphysematous pyelonephritis on the left with multiple areas of gas scattered about the renal cortex and within renal cysts.  Phone epic edema with no hydronephrosis.  Emphysematous cystitis.  Adult polycystic kidney disease is otherwise stable.    January 31: White blood cells 15.6, hemoglobin 7.3, hematocrit 24.5, platelets 231, sodium 134, potassium 4.2, chloride 101, CO2 26, BUN 56, creatinine 4.93, glucose 226  - CT abdomen pelvis noted that although this examination is not tailored for evaluation of pulmonary embolism, there may be a filling defect in the right middle lobe branch.  Emphysematous pyelonephritis again noted.  Small left pleural effusion.  Polycystic kidney disease with hepatic cysts  -CTA chest had no pulmonary emboli, mild pulmonary edema, mild ground-glass opacities could be secondary to pulmonary edema.  Small bilateral pleural effusions with mild bibasilar atelectasis    January 30:  Chest x-ray noted right jugular venous catheter is again noted in place and appears stable.  Lungs appeared normal.    January 29: Sodium 133, potassium 3.3, chloride 95, CO2 26, BUN 86, creatinine 5.94, glucose 105, white blood cells 15.4, hemoglobin 8, hematocrit 24.9, platelets 300    January 27: White blood cells 14.7, hemoglobin 8, hematocrit 24.9, platelets 469, sodium 125, potassium 4.8, chloride 90, CO2 19, , creatinine 10.33, glucose 117, lactic acid 1.6  -blood cultures with no growth to date  -CT abdomen and pelvis showed emphysematous pyelonephritis again noted in the left kidney was scattered gas within the periphery of the left kidney as well as within the renal cyst.  Trace bilateral pleural effusions.  Mild new ascites/edema.  Polycystic kidney disease.  Numerous hepatic cysts.    January 26: White blood cells 17.5, hemoglobin 6.8, hematocrit 21.2, platelets 386, sodium 126, potassium 4.7, chloride 94, CO2 17, , creatinine 10.8, glucose 82      January 25: White blood  cells 21.1, hemoglobin 8.4, hematocrit 25.9, platelets 487, sodium 124, potassium 4.6, chloride 89, CO2 19, , creatinine 11.34, glucose 113, , lactic acid 2, INR 1.8  -urine culture with E coli  -blood cultures grew E.coli  -renal ultrasound had findings of adult polycystic kidney disease.  No hydronephrosis or calculus seen.  Emphysematous pyelonephritis noted on CT was not readily apparent on ultrasound.      Instructions    Admit to Hospital Medicine  Consult Urology  Consult Nephrology for continued dialysis      SHARON Thomason MD  Hospital Medicine Staff  Cell: [phone]

## 2025-02-04 ENCOUNTER — HOSPITAL ENCOUNTER (INPATIENT)
Facility: HOSPITAL | Age: 60
LOS: 2 days | Discharge: ANOTHER HOSPITAL | DRG: 690 | End: 2025-02-06
Attending: INTERNAL MEDICINE | Admitting: INTERNAL MEDICINE
Payer: COMMERCIAL

## 2025-02-04 DIAGNOSIS — N12 EMPHYSEMATOUS PYELONEPHRITIS: Primary | ICD-10-CM

## 2025-02-04 DIAGNOSIS — N12 PYELONEPHRITIS: ICD-10-CM

## 2025-02-04 DIAGNOSIS — R07.9 CHEST PAIN: ICD-10-CM

## 2025-02-04 PROBLEM — N18.6 ESRD (END STAGE RENAL DISEASE): Status: ACTIVE | Noted: 2025-02-04

## 2025-02-04 PROBLEM — I10 HYPERTENSION: Status: ACTIVE | Noted: 2025-02-04

## 2025-02-04 PROBLEM — E66.9 OBESITY (BMI 30-39.9): Status: ACTIVE | Noted: 2025-02-04

## 2025-02-04 PROBLEM — R05.9 COUGH: Status: ACTIVE | Noted: 2025-02-04

## 2025-02-04 PROBLEM — Z87.891 FORMER SMOKER: Status: ACTIVE | Noted: 2025-02-04

## 2025-02-04 LAB
ALBUMIN SERPL BCP-MCNC: 1.8 G/DL (ref 3.5–5.2)
ALP SERPL-CCNC: 74 U/L (ref 40–150)
ALT SERPL W/O P-5'-P-CCNC: 23 U/L (ref 10–44)
ANION GAP SERPL CALC-SCNC: 9 MMOL/L (ref 8–16)
AST SERPL-CCNC: 25 U/L (ref 10–40)
BACTERIA #/AREA URNS AUTO: ABNORMAL /HPF
BASOPHILS # BLD AUTO: 0.02 K/UL (ref 0–0.2)
BASOPHILS NFR BLD: 0.1 % (ref 0–1.9)
BILIRUB SERPL-MCNC: 0.3 MG/DL (ref 0.1–1)
BILIRUB UR QL STRIP: NEGATIVE
BUN SERPL-MCNC: 45 MG/DL (ref 8–23)
CALCIUM SERPL-MCNC: 7.6 MG/DL (ref 8.7–10.5)
CHLORIDE SERPL-SCNC: 98 MMOL/L (ref 95–110)
CLARITY UR REFRACT.AUTO: ABNORMAL
CO2 SERPL-SCNC: 21 MMOL/L (ref 23–29)
COLOR UR AUTO: YELLOW
CREAT SERPL-MCNC: 5.2 MG/DL (ref 0.5–1.4)
DIFFERENTIAL METHOD BLD: ABNORMAL
EOSINOPHIL # BLD AUTO: 0 K/UL (ref 0–0.5)
EOSINOPHIL NFR BLD: 0.2 % (ref 0–8)
ERYTHROCYTE [DISTWIDTH] IN BLOOD BY AUTOMATED COUNT: 17.8 % (ref 11.5–14.5)
EST. GFR  (NO RACE VARIABLE): 11.8 ML/MIN/1.73 M^2
GLUCOSE SERPL-MCNC: 106 MG/DL (ref 70–110)
GLUCOSE UR QL STRIP: NEGATIVE
HCT VFR BLD AUTO: 28.3 % (ref 40–54)
HGB BLD-MCNC: 8.5 G/DL (ref 14–18)
HGB UR QL STRIP: ABNORMAL
HYALINE CASTS UR QL AUTO: 0 /LPF
IMM GRANULOCYTES # BLD AUTO: 0.16 K/UL (ref 0–0.04)
IMM GRANULOCYTES NFR BLD AUTO: 1 % (ref 0–0.5)
KETONES UR QL STRIP: NEGATIVE
LEUKOCYTE ESTERASE UR QL STRIP: ABNORMAL
LYMPHOCYTES # BLD AUTO: 0.4 K/UL (ref 1–4.8)
LYMPHOCYTES NFR BLD: 2.6 % (ref 18–48)
MAGNESIUM SERPL-MCNC: 1.8 MG/DL (ref 1.6–2.6)
MCH RBC QN AUTO: 28.8 PG (ref 27–31)
MCHC RBC AUTO-ENTMCNC: 30 G/DL (ref 32–36)
MCV RBC AUTO: 96 FL (ref 82–98)
MICROSCOPIC COMMENT: ABNORMAL
MONOCYTES # BLD AUTO: 1.1 K/UL (ref 0.3–1)
MONOCYTES NFR BLD: 6.5 % (ref 4–15)
NEUTROPHILS # BLD AUTO: 14.5 K/UL (ref 1.8–7.7)
NEUTROPHILS NFR BLD: 89.6 % (ref 38–73)
NITRITE UR QL STRIP: NEGATIVE
NRBC BLD-RTO: 0 /100 WBC
PH UR STRIP: 7 [PH] (ref 5–8)
PHOSPHATE SERPL-MCNC: 5.7 MG/DL (ref 2.7–4.5)
PLATELET # BLD AUTO: 250 K/UL (ref 150–450)
PMV BLD AUTO: 10 FL (ref 9.2–12.9)
POTASSIUM SERPL-SCNC: 4.3 MMOL/L (ref 3.5–5.1)
PROT SERPL-MCNC: 5.6 G/DL (ref 6–8.4)
PROT UR QL STRIP: ABNORMAL
RBC # BLD AUTO: 2.95 M/UL (ref 4.6–6.2)
RBC #/AREA URNS AUTO: 21 /HPF (ref 0–4)
SODIUM SERPL-SCNC: 128 MMOL/L (ref 136–145)
SP GR UR STRIP: 1.01 (ref 1–1.03)
SQUAMOUS #/AREA URNS AUTO: 1 /HPF
URN SPEC COLLECT METH UR: ABNORMAL
WBC # BLD AUTO: 16.24 K/UL (ref 3.9–12.7)
WBC #/AREA URNS AUTO: >100 /HPF (ref 0–5)
WBC CLUMPS UR QL AUTO: ABNORMAL

## 2025-02-04 PROCEDURE — 83735 ASSAY OF MAGNESIUM: CPT | Mod: NTX

## 2025-02-04 PROCEDURE — 25000003 PHARM REV CODE 250: Mod: NTX | Performed by: FAMILY MEDICINE

## 2025-02-04 PROCEDURE — 25000003 PHARM REV CODE 250: Mod: NTX

## 2025-02-04 PROCEDURE — 80053 COMPREHEN METABOLIC PANEL: CPT | Mod: NTX

## 2025-02-04 PROCEDURE — 87086 URINE CULTURE/COLONY COUNT: CPT | Mod: NTX

## 2025-02-04 PROCEDURE — 87040 BLOOD CULTURE FOR BACTERIA: CPT | Mod: NTX

## 2025-02-04 PROCEDURE — 36415 COLL VENOUS BLD VENIPUNCTURE: CPT | Mod: NTX

## 2025-02-04 PROCEDURE — 81001 URINALYSIS AUTO W/SCOPE: CPT | Mod: NTX

## 2025-02-04 PROCEDURE — 84100 ASSAY OF PHOSPHORUS: CPT | Mod: NTX

## 2025-02-04 PROCEDURE — 63600175 PHARM REV CODE 636 W HCPCS: Mod: NTX

## 2025-02-04 PROCEDURE — 85025 COMPLETE CBC W/AUTO DIFF WBC: CPT | Mod: NTX

## 2025-02-04 PROCEDURE — 20600001 HC STEP DOWN PRIVATE ROOM: Mod: NTX

## 2025-02-04 PROCEDURE — 87086 URINE CULTURE/COLONY COUNT: CPT | Mod: 59,NTX

## 2025-02-04 RX ORDER — SODIUM CHLORIDE 0.9 % (FLUSH) 0.9 %
10 SYRINGE (ML) INJECTION EVERY 12 HOURS PRN
Status: DISCONTINUED | OUTPATIENT
Start: 2025-02-04 | End: 2025-02-06 | Stop reason: HOSPADM

## 2025-02-04 RX ORDER — METOPROLOL SUCCINATE 25 MG/1
25 TABLET, EXTENDED RELEASE ORAL DAILY
Status: DISCONTINUED | OUTPATIENT
Start: 2025-02-04 | End: 2025-02-04

## 2025-02-04 RX ORDER — NALOXONE HCL 0.4 MG/ML
0.02 VIAL (ML) INJECTION
Status: DISCONTINUED | OUTPATIENT
Start: 2025-02-04 | End: 2025-02-06 | Stop reason: HOSPADM

## 2025-02-04 RX ORDER — GLUCAGON 1 MG
1 KIT INJECTION
Status: DISCONTINUED | OUTPATIENT
Start: 2025-02-04 | End: 2025-02-06 | Stop reason: HOSPADM

## 2025-02-04 RX ORDER — METOPROLOL SUCCINATE 25 MG/1
25 TABLET, EXTENDED RELEASE ORAL EVERY EVENING
Status: DISCONTINUED | OUTPATIENT
Start: 2025-02-04 | End: 2025-02-06 | Stop reason: HOSPADM

## 2025-02-04 RX ORDER — ONDANSETRON HYDROCHLORIDE 2 MG/ML
4 INJECTION, SOLUTION INTRAVENOUS EVERY 8 HOURS PRN
Status: DISCONTINUED | OUTPATIENT
Start: 2025-02-04 | End: 2025-02-06 | Stop reason: HOSPADM

## 2025-02-04 RX ORDER — AMIODARONE HYDROCHLORIDE 200 MG/1
200 TABLET ORAL DAILY
Status: DISCONTINUED | OUTPATIENT
Start: 2025-02-05 | End: 2025-02-06 | Stop reason: HOSPADM

## 2025-02-04 RX ORDER — CEFTRIAXONE 2 G/1
2 INJECTION, POWDER, FOR SOLUTION INTRAMUSCULAR; INTRAVENOUS
Status: DISCONTINUED | OUTPATIENT
Start: 2025-02-04 | End: 2025-02-06 | Stop reason: HOSPADM

## 2025-02-04 RX ORDER — TALC
6 POWDER (GRAM) TOPICAL NIGHTLY PRN
Status: DISCONTINUED | OUTPATIENT
Start: 2025-02-04 | End: 2025-02-06 | Stop reason: HOSPADM

## 2025-02-04 RX ORDER — IBUPROFEN 200 MG
16 TABLET ORAL
Status: DISCONTINUED | OUTPATIENT
Start: 2025-02-04 | End: 2025-02-06 | Stop reason: HOSPADM

## 2025-02-04 RX ORDER — IPRATROPIUM BROMIDE AND ALBUTEROL SULFATE 2.5; .5 MG/3ML; MG/3ML
3 SOLUTION RESPIRATORY (INHALATION) EVERY 6 HOURS PRN
Status: DISCONTINUED | OUTPATIENT
Start: 2025-02-04 | End: 2025-02-06 | Stop reason: HOSPADM

## 2025-02-04 RX ORDER — METOPROLOL SUCCINATE 25 MG/1
25 TABLET, EXTENDED RELEASE ORAL DAILY
COMMUNITY

## 2025-02-04 RX ORDER — IBUPROFEN 200 MG
24 TABLET ORAL
Status: DISCONTINUED | OUTPATIENT
Start: 2025-02-04 | End: 2025-02-06 | Stop reason: HOSPADM

## 2025-02-04 RX ORDER — ALBUTEROL SULFATE 90 UG/1
2 INHALANT RESPIRATORY (INHALATION) EVERY 6 HOURS PRN
Status: DISCONTINUED | OUTPATIENT
Start: 2025-02-04 | End: 2025-02-06 | Stop reason: HOSPADM

## 2025-02-04 RX ORDER — OXYCODONE HYDROCHLORIDE 5 MG/1
5 TABLET ORAL EVERY 4 HOURS PRN
Status: DISCONTINUED | OUTPATIENT
Start: 2025-02-04 | End: 2025-02-06 | Stop reason: HOSPADM

## 2025-02-04 RX ORDER — ACETAMINOPHEN 325 MG/1
650 TABLET ORAL EVERY 4 HOURS PRN
Status: DISCONTINUED | OUTPATIENT
Start: 2025-02-04 | End: 2025-02-06 | Stop reason: HOSPADM

## 2025-02-04 RX ADMIN — CEFTRIAXONE 2 G: 2 INJECTION, POWDER, FOR SOLUTION INTRAMUSCULAR; INTRAVENOUS at 06:02

## 2025-02-04 RX ADMIN — OXYCODONE 5 MG: 5 TABLET ORAL at 06:02

## 2025-02-04 RX ADMIN — METOPROLOL SUCCINATE 25 MG: 25 TABLET, EXTENDED RELEASE ORAL at 05:02

## 2025-02-04 NOTE — H&P
[facility] - Telemetry [facility]  History & Physical    Patient Name: Kenneth Phillip  MRN: [id number]  Patient Class: IP- Inpatient  Admission Date: 2/4/2025  Attending Physician: Sergio Bloom III*   Primary Care Provider: Марина Primary Doctor         Patient information was obtained from patient and past medical records.     Subjective:     Principal Problem:Emphysematous pyelonephritis    Chief Complaint: No chief complaint on file.       HPI: FROM  NOTE:   59-year-old male with a history of ADPKD, episodes of MARIZA superimposed on CKD (baseline creatinine appears to be around 6-7), hypertension, atrial fibrillation (on Xarelto), and UTIs admitted to [facility] January 25 with hiccups, nausea with vomiting, dizziness, poor oral intake, dysuria, hematuria, and flank pain. Flank pain was mostly on the left. He had no fever chills, no chest pain or dyspnea, and no evidence of GI bleeding. Chest x-ray and CT head had no acute abnormalities. Labs were concerning for leukocytosis, anemia, hyponatremia, and worsened renal function. CT of the abdomen and pelvis noted emphysematous pyelonephritis on the left with multiple areas of gas scattered about the renal cortex and within renal cysts. There was also perinephric edema and emphysematous cystitis. Blood and urine cultures were collected, and he was admitted and placed on antibiotics. He received normal saline for the hyponatremia. Xarelto was held with concern for hematuria. He was seen by Nephrology who noted patient has end-stage renal disease and was advised to initiate dialysis previously but he was hesitant. He was initiated on bicarbonate infusion with his metabolic acidosis. They again discussed with the patient initiation of hemodialysis. He was seen by Urology who recommended treatment of UTI with no specific  intervention at that time. Overnight on January 26-27, he developed worsening hypotension with tachycardia and fever. He  was transferred to the ICU. Hemoglobin on January 26 was noted to be low, and he received packed red blood cells. He was noted to be lethargic in the ICU, and additional IV fluid was ordered. He required pressors. Course was complicated by metabolic encephalopathy, but mentation has improved. Hemodynamic status has improved, and patient transitioned out of the ICU to a [facility] bed. Patient agreed to initiation of hemodialysis, and he has been undergoing hemodialysis through a temporary IJ catheter. Blood and urine cultures from January 25 grew E coli that was sensitive to ceftriaxone. He remains on ceftriaxone, and repeat blood cultures from January 27 and January 31 have no growth to date. Repeat CT of the abdomen and pelvis on January 31 showed persistent emphysematous pyelonephritis. Referring team is requesting transfer to Hospital Medicine at [facility] for Urology evaluation for potential nephrectomy. Referring team spoke with Urology at [facility] and they are available for consultation. Uncertain at present if surgical intervention will be needed. Will plan transfer to Hospital Medicine at [facility] for further treatment.         59 yoM with pmh of ADPKD now on HD, HTN, afib (questionable xarelto hx), and UTIs who presents as a transfer for urological evaluation for emphysematous pyelonephritis after being treated at OSH for urosepsis. On arrival to [facility] he was resting comfortably in his bed. He reports that his only complaint at this time is abdominal soreness from his recent drain placement, otherwise he feels significantly improved since initially presenting. He explained that about a week ago he passed and that was when his family brought him in and the urosepsis, etc was found. E coli was isolated in both his urine and blood, but he has reportedly since cleared cultures. He currently denies any fever, chills, or SOB. He does report some cough with sputum that he apparently got a CXR  for and it was clear. He has some intermittent N/V as well.    On arrival to [facility], he was HDS and AF. Most recent labs available were from  and showed: White blood cells 15.6, hemoglobin 7.3, hematocrit 24.5, platelets 231, sodium 134, potassium 4.2, chloride 101, CO2 26, BUN 56, creatinine 4.93, glucose 226. Upon arrival, labs and repeat infectious workup was ordered. Nephrology was consulted for HD needs and urology for possible nephrectomy. His ceftriaxone was continued, although I do not know the start date.    Past Medical History:   Diagnosis Date    Anemia     Atrial fibrillation     Disorder of kidney and ureter     GERD (gastroesophageal reflux disease)     Hypertension     Obesity     Polycystic kidney disease        Past Surgical History:   Procedure Laterality Date    CARDIAC ELECTROPHYSIOLOGY STUDY AND ABLATION      TOTAL KNEE ARTHROPLASTY Right        Review of patient's allergies indicates:   Allergen Reactions    Penicillins Other (See Comments)       No current facility-administered medications on file prior to encounter.     Current Outpatient Medications on File Prior to Encounter   Medication Sig    amiodarone (PACERONE) 200 MG Tab Take 200 mg by mouth.    amLODIPine (NORVASC) 5 MG tablet Take 5 mg by mouth once daily.    benazepriL (LOTENSIN) 40 MG tablet Take 40 mg by mouth.    carvediloL (COREG) 25 MG tablet     JYNARQUE 45 mg (AM)/ 15 mg (PM) TbSQ     pantoprazole (PROTONIX) 40 MG tablet     rivaroxaban (XARELTO) 20 mg Tab Take 1 tablet by mouth once daily. (Patient not taking: Reported on 2024)     Family History       Problem Relation (Age of Onset)    Kidney disease Father, Sister, Son    Polycystic kidney disease Father, Sister, Son          Tobacco Use    Smoking status: Former     Current packs/day: 0.00     Types: Cigarettes     Quit date: 2013     Years since quittin.3    Smokeless tobacco: Never   Substance and Sexual Activity    Alcohol use: Not on file    Drug  use: Not on file    Sexual activity: Not on file     Review of Systems   Constitutional:  Negative for chills and fever.   HENT:  Negative for congestion and sore throat.    Respiratory:  Positive for cough and wheezing. Negative for shortness of breath.    Cardiovascular:  Negative for chest pain and leg swelling.   Gastrointestinal:  Positive for abdominal pain (soreness), nausea and vomiting.   Genitourinary:  Negative for dysuria, frequency and urgency.   Neurological:  Negative for headaches.     Objective:     Vital Signs (Most Recent):  Temp: 98.3 °F (36.8 °C) (02/04/25 0427)  Pulse: 81 (02/04/25 0427)  Resp: 18 (02/04/25 0427)  BP: 134/74 (02/04/25 0427)  SpO2: 96 % (02/04/25 0427) Vital Signs (24h Range):  Temp:  [97.8 °F (36.6 °C)-99.7 °F (37.6 °C)] 98.3 °F (36.8 °C)  Pulse:  [74-91] 81  Resp:  [15-18] 18  SpO2:  [91 %-96 %] 96 %  BP: (115-160)/(58-89) 134/74        There is no height or weight on file to calculate BMI.     Physical Exam  Constitutional:       General: He is not in acute distress.     Appearance: He is not ill-appearing.   HENT:      Head: Normocephalic and atraumatic.      Mouth/Throat:      Mouth: Mucous membranes are moist.      Pharynx: Oropharynx is clear.   Eyes:      General: No scleral icterus.  Cardiovascular:      Rate and Rhythm: Normal rate. Rhythm irregular.   Pulmonary:      Effort: Pulmonary effort is normal. No respiratory distress.      Breath sounds: Wheezing (mild) present. No rhonchi or rales.   Abdominal:      General: There is distension (he reports this is his baseline).      Palpations: Abdomen is soft.      Tenderness: There is abdominal tenderness (right side where drains are). There is no guarding or rebound.      Comments: Multiple surgical drains exiting the left abdomen    Musculoskeletal:      Right lower leg: No edema.      Left lower leg: No edema.   Skin:     General: Skin is warm and dry.      Coloration: Skin is pale.   Neurological:      Mental Status:  "He is alert and oriented to person, place, and time.   Psychiatric:         Mood and Affect: Mood normal.         Behavior: Behavior normal.                Significant Labs: All pertinent labs within the past 24 hours have been reviewed.  CBC: No results for input(s): "WBC", "HGB", "HCT", "PLT" in the last 48 hours.  CMP: No results for input(s): "NA", "K", "CL", "CO2", "GLU", "BUN", "CREATININE", "CALCIUM", "PROT", "ALBUMIN", "BILITOT", "ALKPHOS", "AST", "ALT", "ANIONGAP", "EGFRNONAA" in the last 48 hours.    Invalid input(s): "ESTGFAFRICA"    Significant Imaging: I have reviewed all pertinent imaging results/findings within the past 24 hours.  Assessment/Plan:     * Emphysematous pyelonephritis  59 yoM with pmh of ADPKD now on HD, HTN, afib (questionable xarelto hx), and UTIs who presents as a transfer for urological evaluation for emphysematous pyelonephritis after being treated at OSH for urosepsis. There he was found to have a UTI and bacteremia secondary to E. Coli. CT A/P showed: Patient has developed emphysematous pyelonephritis on the left since prior study with multiple areas of gas scattered about the renal cortex and within renal cysts. Perinephric edema has also developed. 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 Multiple cysts in the liver also noted. He required MICU for vasopressors and was started on HD as well. Infection improved with abx and surgical drainage. He has apparently cleared cultures and the E coli is sensitive to ceftriaxone. Clinically, he appears much improved since presentation. On arrival to [facility] he is HDS and AF.     PLAN:  -Continue ceftriaxone; will need to determine EOT  -Follow up repeat infectious workup  -Urology consulted for possible nephrectomy  -NPO pending intervention  -Holding AC  -Follow up labs since he has none recently available.    "       Cough  Reports a productive cough developing the last several days. Spo2 96% on arrival today. Says he had a CXR at OSH that was without PNA, but this was on 1/25.    -F/u repeat CXR  -Monitor respiratory status.      Former smoker  Reports smoking from age [age]. Some wheezing heard on exam but no SOB.    -Consider outpatient PFTs  -Albuterol and duonebs prn      Obesity (BMI 30-39.9)  There is no height or weight on file to calculate BMI. Morbid obesity complicates all aspects of disease management from diagnostic modalities to treatment. Weight loss encouraged and health benefits explained to patient.         ESRD (end stage renal disease)  Creatine stable for now. BMP reviewed- noted CrCl cannot be calculated (Patient's most recent lab result is older than the maximum 7 days allowed.). according to latest data. Based on current GFR, CKD stage is end stage.  Monitor UOP and serial BMP and adjust therapy as needed. Renally dose meds. Avoid nephrotoxic medications and procedures.    -Nephro for HD  -Will need access- has an IJ catheter in right now    Hypertension  Patient's blood pressure range in the last 24 hours was: BP  Min: 115/64  Max: 160/89.The patient's inpatient anti-hypertensive regimen is listed below:  Current Antihypertensives   Toprol 25  Amlodipine 5      BP Readings from Last 1 Encounters:   02/04/25 134/74         Plan  - He reports he has not restarted these since dealing with hypotension in the MICU  -Restarted Toprol 25 mg daily. Restart the amlodipine if additional BP control required.    -ordered toprol for evening time as he reported some mild hypotension during HD sessions, also instructions to give after HD on HD days  -When wife arrives with med list, would verify accuracy of these medications     Atrial fibrillation  He reports taking amiodarone and xarelto at home prior to this admission. Additionally, he only reports taking the xarelto when he felt he was in afib, and was taking  a half pill when he did. He also mentioned an ablation? He reports the amiodarone was stopped while he is in the hospital, but cannot provide more detail. His AC was also stopped due to hematuria while at OSH. On admission to [facility], he was in aifb but was rate controlled with a HR of 83.    I had held any AC pending urologic evaluation and possible intervention, so I did not restart his amiodarone.     -Would clarify his AC and amiodarone regimen  -Restart AC when safe from surgical perspective      Autosomal dominant polycystic kidney disease  Patient has had longstanding history of kidney disease. It appears HD had been on the table but this recent infection set him over the edge and he became ESRD. He is still hoping for transplant eventually.         VTE Risk Mitigation (From admission, onward)           Ordered     IP VTE HIGH RISK PATIENT  Once         02/04/25 0431     Place sequential compression device  Until discontinued         02/04/25 0431     Reason for No Pharmacological VTE Prophylaxis  Once        Question:  Reasons:  Answer:  Physician Provided (leave comment)  Comment:  possible surgery    02/04/25 0431                                    Francisco Salas DO  Department of Hospital Medicine  [facility] - Telemetry Stepdown

## 2025-02-04 NOTE — SUBJECTIVE & OBJECTIVE
Past Medical History:   Diagnosis Date    Anemia     Atrial fibrillation     Disorder of kidney and ureter     GERD (gastroesophageal reflux disease)     Hypertension     Obesity     Polycystic kidney disease        Past Surgical History:   Procedure Laterality Date    CARDIAC ELECTROPHYSIOLOGY STUDY AND ABLATION      TOTAL KNEE ARTHROPLASTY Right        Review of patient's allergies indicates:   Allergen Reactions    Penicillins Other (See Comments)       Family History       Problem Relation (Age of Onset)    Kidney disease Father, Sister, Son    Polycystic kidney disease Father, Sister, Son            Tobacco Use    Smoking status: Former     Current packs/day: 0.00     Types: Cigarettes     Quit date: 2013     Years since quittin.3    Smokeless tobacco: Never   Substance and Sexual Activity    Alcohol use: Not on file    Drug use: Not on file    Sexual activity: Not on file       Review of Systems   Constitutional:  Negative for chills and fever.       Objective:     Temp:  [97.6 °F (36.4 °C)-98.9 °F (37.2 °C)] 98.9 °F (37.2 °C)  Pulse:  [74-85] 77  Resp:  [17-18] 18  SpO2:  [95 %-97 %] 97 %  BP: (115-160)/(60-89) 122/74  Weight: 109 kg (240 lb 4.8 oz)  Body mass index is 36.54 kg/m².    Date 25 07 - 25 0659   Shift 9707-6924 9544-1980 0516-9951 24 Hour Total   INTAKE   Shift Total(mL/kg)       OUTPUT   Urine(mL/kg/hr) 550   550   Drains 40   40   Shift Total(mL/kg) 590(5.4)   590(5.4)   Weight (kg) 109 109 109 109          Drains       Drain  Duration             Trialysis (Dialysis) Catheter 25 0130 right internal jugular 1 day         Closed/Suction Drain 25 0130 Tube - 1 Lateral LUQ Bulb <1 day         Closed/Suction Drain 25 0130 Tube - 2 Lateral LLQ Bulb <1 day         Closed/Suction Drain 25 0130 Tube - 3 Lateral LLQ Bulb <1 day                     Physical Exam  Constitutional:       Appearance: Normal appearance.   Abdominal:      Comments: 3 left flank JACK  drains draining purulent light brown    Neurological:      Mental Status: He is alert.          Significant Labs:    BMP:  Recent Labs   Lab 02/04/25  1045   *   K 4.3   CL 98   CO2 21*   BUN 45*   CREATININE 5.2*   CALCIUM 7.6*       CBC:  Recent Labs   Lab 02/04/25  1045   WBC 16.24*   HGB 8.5*   HCT 28.3*          All pertinent labs results from the past 24 hours have been reviewed.    Significant Imaging:  All pertinent imaging results/findings from the past 24 hours have been reviewed.

## 2025-02-04 NOTE — ASSESSMENT & PLAN NOTE
Creatine stable for now. BMP reviewed- noted CrCl cannot be calculated (Patient's most recent lab result is older than the maximum 7 days allowed.). according to latest data. Based on current GFR, CKD stage is end stage.  Monitor UOP and serial BMP and adjust therapy as needed. Renally dose meds. Avoid nephrotoxic medications and procedures.    -Nephro for HD  -Will need access- has an IJ catheter in right now

## 2025-02-04 NOTE — CONSULTS
[facility] - Telemetry Stepdown  Nephrology  Consult Note    Patient Name: Kenneth Phillip  MRN: [id number]  Admission Date: 2/4/2025  Hospital Length of Stay: 0 days  Attending Provider: Sergio Bloom III*   Primary Care Physician: Марина, Primary Doctor  Principal Problem:Emphysematous pyelonephritis    Inpatient consult to Nephrology  Consult performed by: Latrell Ayoub MD  Consult ordered by: Francisco Salas DO  Reason for consult: HD needs        Subjective:     HPI: 59 yoM with pmh of ADPKD now on HD, HTN, afib (questionable xarelto hx), and UTIs who presents as a transfer for urological evaluation for emphysematous pyelonephritis after being treated at OSH for urosepsis. [place] [facility] [place] resting comfortably in his bed. He reports that his only complaint at this time is abdominal soreness from his recent drain placement, otherwise he feels significantly improved since initially presenting. He explained that about a week ago he passed out and that was when his family brought him in and the urosepsis, etc was found. E coli was isolated in both his urine and blood, but he has reportedly since cleared cultures. He currently denies any fever, chills, or SOB. He does report some cough with sputum that he apparently got a CXR for and it was clear. He has some intermittent N/V as well.     [place] [facility] was HDS and AF. Most recent labs available were from 1/31 and showed: White blood cells 15.6, hemoglobin 7.3, hematocrit 24.5, platelets 231, sodium 134, potassium 4.2, chloride 101, CO2 26, BUN 56, creatinine 4.93, glucose 226. Upon arrival, labs and repeat infectious workup was ordered. Nephrology was consulted for HD needs and urology for possible nephrectomy. His ceftriaxone was continued, although I do not know the start date.    Past Medical History:   Diagnosis Date    Anemia     Atrial fibrillation     Disorder of kidney and ureter     GERD (gastroesophageal reflux disease)     Hypertension     Obesity      Polycystic kidney disease        Past Surgical History:   Procedure Laterality Date    CARDIAC ELECTROPHYSIOLOGY STUDY AND ABLATION      TOTAL KNEE ARTHROPLASTY Right        Review of patient's allergies indicates:   Allergen Reactions    Penicillins Other (See Comments)     Current Facility-Administered Medications   Medication Frequency    acetaminophen tablet 650 mg Q4H PRN    albuterol inhaler 2 puff Q6H PRN    albuterol-ipratropium 2.5 mg-0.5 mg/3 mL nebulizer solution 3 mL Q6H PRN    cefTRIAXone injection 2 g Q24H    dextrose 50% injection 12.5 g PRN    dextrose 50% injection 25 g PRN    glucagon (human recombinant) injection 1 mg PRN    glucose chewable tablet 16 g PRN    glucose chewable tablet 24 g PRN    melatonin tablet 6 mg Nightly PRN    metoprolol succinate (TOPROL-XL) 24 hr tablet 25 mg Daily PM    naloxone 0.4 mg/mL injection 0.02 mg PRN    ondansetron injection 4 mg Q8H PRN    sodium chloride 0.9% flush 10 mL Q12H PRN     Family History       Problem Relation (Age of Onset)    Kidney disease Father, Sister, Son    Polycystic kidney disease Father, Sister, Son          Tobacco Use    Smoking status: Former     Current packs/day: 0.00     Types: Cigarettes     Quit date: 2013     Years since quittin.3    Smokeless tobacco: Never   Substance and Sexual Activity    Alcohol use: Not on file    Drug use: Not on file    Sexual activity: Not on file     Review of Systems  Objective:     Vital Signs (Most Recent):  Temp: 97.6 °F (36.4 °C) (25 07)  Pulse: 75 (25 07)  Resp: 18 (25)  BP: 125/69 (25 0725)  SpO2: 96 % (25) Vital Signs (24h Range):  Temp:  [97.6 °F (36.4 °C)-99.7 °F (37.6 °C)] 97.6 °F (36.4 °C)  Pulse:  [74-85] 75  Resp:  [15-18] 18  SpO2:  [92 %-96 %] 96 %  BP: (115-160)/(60-89) 125/69     Weight: 109 kg (240 lb 4.8 oz) (25 1000)  Body mass index is 36.54 kg/m².  Body surface area is 2.29 meters squared.    I/O last 3 completed  "shifts:  In: -   Out: 400 [Urine:350; Drains:50]     Physical Exam  HENT:      Head: Normocephalic and atraumatic.   Eyes:      Extraocular Movements: Extraocular movements intact.      Pupils: Pupils are equal, round, and reactive to light.   Neck:      Comments: CVC in place in RIJ, clean and dry  Cardiovascular:      Rate and Rhythm: Normal rate.      Pulses: Normal pulses.      Heart sounds: Normal heart sounds.   Pulmonary:      Effort: Pulmonary effort is normal.      Breath sounds: Normal breath sounds.   Abdominal:      General: Bowel sounds are normal.   Musculoskeletal:      Right lower leg: Edema present.      Left lower leg: Edema present.   Neurological:      General: No focal deficit present.      Mental Status: He is oriented to person, place, and time.          Significant Labs:  CBC:   Recent Labs   Lab 02/04/25  1045   WBC 16.24*   RBC 2.95*   HGB 8.5*   HCT 28.3*      MCV 96   MCH 28.8   MCHC 30.0*     CMP: No results for input(s): "GLU", "CALCIUM", "ALBUMIN", "PROT", "NA", "K", "CO2", "CL", "BUN", "CREATININE", "ALKPHOS", "ALT", "AST", "BILITOT" in the last 168 hours.  All labs within the past 24 hours have been reviewed.    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 Pt was admitted to OSH on 1/25, found to have MARIZA on CKD as well as emphysematous pyelonephritis. Renal function worsened with Cr increasing to 12 per pt and he developed uremia. Temporary HD line placed in Right IJ and HD initiated. Pt now transferred to [facility] for Urology consult to assess need for further intervention. Nephrology has been consulted to manage inpatient HD. Pt has increased LE edema on initial eval, but denies any SOB, and shows no signs of uremia. Last HD " session 2 days ago per pt.    Plan:  -Initial Labs pending CBC, CMP, UA, will f/u  -Plan to initiate inpatient HD sometime in next 24 hours pending further lab workup and inpatient Urology intervention        Thank you for your consult. I will follow-up with patient. Please contact us if you have any additional questions.    Latrell Ayoub MD  Nephrology  [facility] - Telemetry Stepdown

## 2025-02-04 NOTE — ASSESSMENT & PLAN NOTE
He reports taking amiodarone and xarelto at home prior to this admission. Additionally, he only reports taking the xarelto when he felt he was in afib, and was taking a half pill when he did. He also mentioned an ablation? He reports the amiodarone was stopped while he is in the hospital, but cannot provide more detail. His AC was also stopped due to hematuria while at OSH. [place] [facility] was in aifb but was rate controlled with a HR of 83.    I had held any AC pending urologic evaluation and possible intervention, so I did not restart his amiodarone.     -Would clarify his AC and amiodarone regimen  -Restart AC when safe from surgical perspective

## 2025-02-04 NOTE — PLAN OF CARE
Patient arrived to unit via EMS, able to transfer self from stretcher to bed. AAOx4, able to make needs known to staff. Left flank JACK drains x3 noted, sanguineous fluid noted to all three JACK drains. JACK sites tender to touch but denies major discomfort at this time. No impaired skin integrity noted. Indep. w/ all ADLS, gen. Weakness noted from hospitalization. Plan of care reviewed and in progressing by team. Safety maintained, none slip socks on, bed in lowest position, urinal within reach.         Problem: Adult Inpatient Plan of Care  Goal: Plan of Care Review  Outcome: Progressing  Goal: Patient-Specific Goal (Individualized)  Outcome: Progressing  Goal: Absence of Hospital-Acquired Illness or Injury  Outcome: Progressing  Goal: Optimal Comfort and Wellbeing  Outcome: Progressing  Goal: Readiness for Transition of Care  Outcome: Progressing     Problem: Fall Injury Risk  Goal: Absence of Fall and Fall-Related Injury  Outcome: Progressing

## 2025-02-04 NOTE — ASSESSMENT & PLAN NOTE
Reports smoking from age [age]. Some wheezing heard on exam but no SOB.    -Consider outpatient PFTs  -Albuterol and duonebs prn

## 2025-02-04 NOTE — ASSESSMENT & PLAN NOTE
Patient has had longstanding history of kidney disease. It appears HD had been on the table but this recent infection set him over the edge and he became ESRD. He is still hoping for transplant eventually.

## 2025-02-04 NOTE — SUBJECTIVE & OBJECTIVE
Past Medical History:   Diagnosis Date    Anemia     Atrial fibrillation     Disorder of kidney and ureter     GERD (gastroesophageal reflux disease)     Hypertension     Obesity     Polycystic kidney disease        Past Surgical History:   Procedure Laterality Date    CARDIAC ELECTROPHYSIOLOGY STUDY AND ABLATION      TOTAL KNEE ARTHROPLASTY Right        Review of patient's allergies indicates:   Allergen Reactions    Penicillins Other (See Comments)     Current Facility-Administered Medications   Medication Frequency    acetaminophen tablet 650 mg Q4H PRN    albuterol inhaler 2 puff Q6H PRN    albuterol-ipratropium 2.5 mg-0.5 mg/3 mL nebulizer solution 3 mL Q6H PRN    cefTRIAXone injection 2 g Q24H    dextrose 50% injection 12.5 g PRN    dextrose 50% injection 25 g PRN    glucagon (human recombinant) injection 1 mg PRN    glucose chewable tablet 16 g PRN    glucose chewable tablet 24 g PRN    melatonin tablet 6 mg Nightly PRN    metoprolol succinate (TOPROL-XL) 24 hr tablet 25 mg Daily PM    naloxone 0.4 mg/mL injection 0.02 mg PRN    ondansetron injection 4 mg Q8H PRN    sodium chloride 0.9% flush 10 mL Q12H PRN     Family History       Problem Relation (Age of Onset)    Kidney disease Father, Sister, Son    Polycystic kidney disease Father, Sister, Son          Tobacco Use    Smoking status: Former     Current packs/day: 0.00     Types: Cigarettes     Quit date: 2013     Years since quittin.3    Smokeless tobacco: Never   Substance and Sexual Activity    Alcohol use: Not on file    Drug use: Not on file    Sexual activity: Not on file     Review of Systems  Objective:     Vital Signs (Most Recent):  Temp: 97.6 °F (36.4 °C) (25)  Pulse: 75 (25)  Resp: 18 (25)  BP: 125/69 (25)  SpO2: 96 % (25) Vital Signs (24h Range):  Temp:  [97.6 °F (36.4 °C)-99.7 °F (37.6 °C)] 97.6 °F (36.4 °C)  Pulse:  [74-85] 75  Resp:  [15-18] 18  SpO2:  [92 %-96 %] 96 %  BP:  "(115-160)/(60-89) 125/69     Weight: 109 kg (240 lb 4.8 oz) (02/04/25 1000)  Body mass index is 36.54 kg/m².  Body surface area is 2.29 meters squared.    I/O last 3 completed shifts:  In: -   Out: 400 [Urine:350; Drains:50]     Physical Exam  HENT:      Head: Normocephalic and atraumatic.   Eyes:      Extraocular Movements: Extraocular movements intact.      Pupils: Pupils are equal, round, and reactive to light.   Neck:      Comments: CVC in [facility] in [facility], clean and dry  Cardiovascular:      Rate and Rhythm: Normal rate.      Pulses: Normal pulses.      Heart sounds: Normal heart sounds.   Pulmonary:      Effort: Pulmonary effort is normal.      Breath sounds: Normal breath sounds.   Abdominal:      General: Bowel sounds are normal.   Musculoskeletal:      Right lower leg: Edema present.      Left lower leg: Edema present.   Neurological:      General: No focal deficit present.      Mental Status: He is oriented to person, place, and time.          Significant Labs:  CBC:   Recent Labs   Lab 02/04/25  1045   WBC 16.24*   RBC 2.95*   HGB 8.5*   HCT 28.3*      MCV 96   MCH 28.8   MCHC 30.0*     CMP: No results for input(s): "GLU", "CALCIUM", "ALBUMIN", "PROT", "NA", "K", "CO2", "CL", "BUN", "CREATININE", "ALKPHOS", "ALT", "AST", "BILITOT" in the last 168 hours.  All labs within the past 24 hours have been reviewed.    Significant Imaging:    "

## 2025-02-04 NOTE — CONSULTS
[facility] - Telemetry Stepdown  Urology  Consult Note    Patient Name: Kenneth Phillip  MRN: [id number]  Admission Date: 2/4/2025  Hospital Length of Stay: 0   Code Status: Full Code   Attending Provider: Sergio Bloom III*   Consulting Provider: Inocencia Broussard MD  Primary Care Physician: No, Primary Doctor  Principal Problem:Emphysematous pyelonephritis    Inpatient consult to Urology  Consult performed by: Inocencia Broussard MD  Consult ordered by: Francisco Salas, DO  Reason for consult: emphysematous pyelitis          Subjective:     HPI:  59 year old male with history of ADPKD (on hemodialysis) and recurrent UTIs who was transferred to [facility] for emphysematous pyelonephritis after being treated at OSH for urosepsis. He denies flank pain, gross hematuria.  He currently denies any fever, chills, or SOB.     Urine grew E coli sensitive to Ceftriaxone, which the patient remains on today.  CT obtained upon admission to [facility] shows bilateral polycystic kidneys and small areas of gas scattered throughout the left kidney within renal cysts.  There is a small foci of air at the dome of the bladder.  He has 3 percutaneous left kidney drains placed on 2/1.      Upon assessment, the patient is AFVSS.  WBC 16.  UA with moderate bacteria, > 100 WBCs, 21 RBCs.  New urine cultures in process.  Blood cx with no growth to date.        Past Medical History:   Diagnosis Date    Anemia     Atrial fibrillation     Disorder of kidney and ureter     GERD (gastroesophageal reflux disease)     Hypertension     Obesity     Polycystic kidney disease        Past Surgical History:   Procedure Laterality Date    CARDIAC ELECTROPHYSIOLOGY STUDY AND ABLATION      TOTAL KNEE ARTHROPLASTY Right        Review of patient's allergies indicates:   Allergen Reactions    Penicillins Other (See Comments)       Family History       Problem Relation (Age of Onset)    Kidney disease Father, Sister, Son    Polycystic kidney disease Father, Sister, Son            Tobacco Use     Smoking status: Former     Current packs/day: 0.00     Types: Cigarettes     Quit date: 2013     Years since quittin.3    Smokeless tobacco: Never   Substance and Sexual Activity    Alcohol use: Not on file    Drug use: Not on file    Sexual activity: Not on file       Review of Systems   Constitutional:  Negative for chills and fever.       Objective:     Temp:  [97.6 °F (36.4 °C)-98.9 °F (37.2 °C)] 98.9 °F (37.2 °C)  Pulse:  [74-85] 77  Resp:  [17-18] 18  SpO2:  [95 %-97 %] 97 %  BP: (115-160)/(60-89) 122/74  Weight: 109 kg (240 lb 4.8 oz)  Body mass index is 36.54 kg/m².    Date 25 0700 - 25 0659   Shift 7679-8203 7070-6780 2424-5887 24 Hour Total   INTAKE   Shift Total(mL/kg)       OUTPUT   Urine(mL/kg/hr) 550   550   Drains 40   40   Shift Total(mL/kg) 590(5.4)   590(5.4)   Weight (kg) 109 109 109 109          Drains       Drain  Duration             Trialysis (Dialysis) Catheter 25 0130 right internal jugular 1 day         Closed/Suction Drain 25 0130 Tube - 1 Lateral LUQ Bulb <1 day         Closed/Suction Drain 25 0130 Tube - 2 Lateral LLQ Bulb <1 day         Closed/Suction Drain 25 0130 Tube - 3 Lateral LLQ Bulb <1 day                     Physical Exam  Constitutional:       Appearance: Normal appearance.   Abdominal:      Comments: 3 left flank JACK drains draining purulent light brown    Neurological:      Mental Status: He is alert.          Significant Labs:    BMP:  Recent Labs   Lab 25  1045   *   K 4.3   CL 98   CO2 21*   BUN 45*   CREATININE 5.2*   CALCIUM 7.6*       CBC:  Recent Labs   Lab 25  1045   WBC 16.24*   HGB 8.5*   HCT 28.3*          All pertinent labs results from the past 24 hours have been reviewed.    Significant Imaging:  All pertinent imaging results/findings from the past 24 hours have been reviewed.                    Assessment and Plan:     * Emphysematous pyelonephritis  - no indication for urgent urologic  intervention. Patient clinically improved s/p drain placement   - recommend guardado catheter for maximal drainage, patient refusing at this time   - agree with broad spectrum antibiotics         VTE Risk Mitigation (From admission, onward)           Ordered     IP VTE HIGH RISK PATIENT  Once         02/04/25 0431     Place sequential compression device  Until discontinued         02/04/25 0431     Reason for No Pharmacological VTE Prophylaxis  Once        Question:  Reasons:  Answer:  Physician Provided (leave comment)  Comment:  possible surgery    02/04/25 0431                    Thank you for your consult.     Inocencia Broussard MD  Urology  [facility] - Telemetry Stepdown

## 2025-02-04 NOTE — ASSESSMENT & PLAN NOTE
Patient's blood pressure range in the last 24 hours was: BP  Min: 115/64  Max: 160/89.The patient's inpatient anti-hypertensive regimen is listed below:  Current Antihypertensives   Toprol 25  Amlodipine 5      BP Readings from Last 1 Encounters:   02/04/25 134/74         Plan  - He reports he has not restarted these since dealing with hypotension in the MICU  -Restarted Toprol 25 mg daily. Restart the amlodipine if additional BP control required.    -ordered toprol for evening time as he reported some mild hypotension during HD sessions, also instructions to give after HD on HD days  -When wife arrives with med list, would verify accuracy of these medications

## 2025-02-04 NOTE — ASSESSMENT & PLAN NOTE
59 yoM with pmh of ADPKD now on HD, HTN, afib (questionable xarelto hx), and UTIs who presents as a transfer for urological evaluation for emphysematous pyelonephritis after being treated at OSH for urosepsis. There he was found to have a UTI and bacteremia secondary to E. Coli. CT A/P showed: Patient has developed emphysematous pyelonephritis on the left since prior study with multiple areas of gas scattered about the renal cortex and within renal cysts. Perinephric edema has also developed. 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 Multiple cysts in the liver also noted. He required MICU for vasopressors and was started on HD as well. Infection improved with abx and surgical drainage. He has apparently cleared cultures and the E coli is sensitive to ceftriaxone. Clinically, he appears much improved since presentation. On arrival to [facility] he is HDS and AF.     PLAN:  -Continue ceftriaxone; will need to determine EOT  -Follow up repeat infectious workup  -Urology consulted for possible nephrectomy  -NPO pending intervention  -Holding AC  -Follow up labs since he has none recently available.

## 2025-02-04 NOTE — SUBJECTIVE & OBJECTIVE
Past Medical History:   Diagnosis Date    Anemia     Atrial fibrillation     Disorder of kidney and ureter     GERD (gastroesophageal reflux disease)     Hypertension     Obesity     Polycystic kidney disease        Past Surgical History:   Procedure Laterality Date    CARDIAC ELECTROPHYSIOLOGY STUDY AND ABLATION      TOTAL KNEE ARTHROPLASTY Right        Review of patient's allergies indicates:   Allergen Reactions    Penicillins Other (See Comments)       No current facility-administered medications on file prior to encounter.     Current Outpatient Medications on File Prior to Encounter   Medication Sig    amiodarone (PACERONE) 200 MG Tab Take 200 mg by mouth.    amLODIPine (NORVASC) 5 MG tablet Take 5 mg by mouth once daily.    benazepriL (LOTENSIN) 40 MG tablet Take 40 mg by mouth.    carvediloL (COREG) 25 MG tablet     JYNARQUE 45 mg (AM)/ 15 mg (PM) TbSQ     pantoprazole (PROTONIX) 40 MG tablet     rivaroxaban (XARELTO) 20 mg Tab Take 1 tablet by mouth once daily. (Patient not taking: Reported on 2024)     Family History       Problem Relation (Age of Onset)    Kidney disease Father, Sister, Son    Polycystic kidney disease Father, Sister, Son          Tobacco Use    Smoking status: Former     Current packs/day: 0.00     Types: Cigarettes     Quit date: 2013     Years since quittin.3    Smokeless tobacco: Never   Substance and Sexual Activity    Alcohol use: Not on file    Drug use: Not on file    Sexual activity: Not on file     Review of Systems   Constitutional:  Negative for chills and fever.   HENT:  Negative for congestion and sore throat.    Respiratory:  Positive for cough and wheezing. Negative for shortness of breath.    Cardiovascular:  Negative for chest pain and leg swelling.   Gastrointestinal:  Positive for abdominal pain (soreness), nausea and vomiting.   Genitourinary:  Negative for dysuria, frequency and urgency.   Neurological:  Negative for headaches.     Objective:     Vital  "Signs (Most Recent):  Temp: 98.3 °F (36.8 °C) (02/04/25 0427)  Pulse: 81 (02/04/25 0427)  Resp: 18 (02/04/25 0427)  BP: 134/74 (02/04/25 0427)  SpO2: 96 % (02/04/25 0427) Vital Signs (24h Range):  Temp:  [97.8 °F (36.6 °C)-99.7 °F (37.6 °C)] 98.3 °F (36.8 °C)  Pulse:  [74-91] 81  Resp:  [15-18] 18  SpO2:  [91 %-96 %] 96 %  BP: (115-160)/(58-89) 134/74        There is no height or weight on file to calculate BMI.     Physical Exam  Constitutional:       General: He is not in acute distress.     Appearance: He is not ill-appearing.   HENT:      Head: Normocephalic and atraumatic.      Mouth/Throat:      Mouth: Mucous membranes are moist.      Pharynx: Oropharynx is clear.   Eyes:      General: No scleral icterus.  Cardiovascular:      Rate and Rhythm: Normal rate. Rhythm irregular.   Pulmonary:      Effort: Pulmonary effort is normal. No respiratory distress.      Breath sounds: Wheezing (mild) present. No rhonchi or rales.   Abdominal:      General: There is distension (he reports this is his baseline).      Palpations: Abdomen is soft.      Tenderness: There is abdominal tenderness (right side where drains are). There is no guarding or rebound.      Comments: Multiple surgical drains exiting the left abdomen    Musculoskeletal:      Right lower leg: No edema.      Left lower leg: No edema.   Skin:     General: Skin is warm and dry.      Coloration: Skin is pale.   Neurological:      Mental Status: He is alert and oriented to person, place, and time.   Psychiatric:         Mood and Affect: Mood normal.         Behavior: Behavior normal.                Significant Labs: All pertinent labs within the past 24 hours have been reviewed.  CBC: No results for input(s): "WBC", "HGB", "HCT", "PLT" in the last 48 hours.  CMP: No results for input(s): "NA", "K", "CL", "CO2", "GLU", "BUN", "CREATININE", "CALCIUM", "PROT", "ALBUMIN", "BILITOT", "ALKPHOS", "AST", "ALT", "ANIONGAP", "EGFRNONAA" in the last 48 hours.    Invalid " "input(s): "GWENDOLYN"    Significant Imaging: I have reviewed all pertinent imaging results/findings within the past 24 hours.  "

## 2025-02-04 NOTE — ASSESSMENT & PLAN NOTE
Reports a productive cough developing the last several days. Spo2 96% on arrival today. Says he had a CXR at OSH that was without PNA, but this was on 1/25.    -F/u repeat CXR  -Monitor respiratory status.

## 2025-02-04 NOTE — ASSESSMENT & PLAN NOTE
- no indication for urgent urologic intervention. Patient clinically improved s/p drain placement   - recommend guardado catheter for maximal drainage, patient refusing at this time   - agree with broad spectrum antibiotics

## 2025-02-04 NOTE — ASSESSMENT & PLAN NOTE
59 yoM with pmh of ADPKD now on HD, HTN, afib (questionable xarelto hx), and UTIs who presents as a transfer for urological evaluation for emphysematous pyelonephritis after being treated at OSH for urosepsis. Pt was admitted to OSH on 1/25, found to have MARIZA on CKD as well as emphysematous pyelonephritis. Renal function worsened with Cr increasing to 12 per pt and he developed uremia. Temporary HD line placed in Right IJ and HD initiated. Pt now transferred to [facility] for Urology consult to assess need for further intervention. Nephrology has been consulted to manage inpatient HD. Pt has increased LE edema on initial eval, but denies any SOB, and shows no signs of uremia. Last HD session 2 days ago per pt.    Plan:  -Initial Labs pending CBC, CMP, UA, will f/u  -Plan to initiate inpatient HD sometime in next 24 hours pending further lab workup and inpatient Urology intervention

## 2025-02-04 NOTE — PLAN OF CARE
Pt AAO, VSS. Pt independent of ADLs; pt voiding clear green urine per urinal. Pt refused to have guardado placed, MD made aware. Pt had BM. Pt with 3 JACK drains intact, draining serosanguineous drainage. Pt with no complaints at this time. Call light in reach, bed in lowest position, wife at bedside. Safety maintained.     Problem: Adult Inpatient Plan of Care  Goal: Plan of Care Review  Outcome: Progressing  Goal: Patient-Specific Goal (Individualized)  Outcome: Progressing  Goal: Absence of Hospital-Acquired Illness or Injury  Outcome: Progressing  Goal: Optimal Comfort and Wellbeing  Outcome: Progressing  Goal: Readiness for Transition of Care  Outcome: Progressing     Problem: Fall Injury Risk  Goal: Absence of Fall and Fall-Related Injury  Outcome: Progressing     Problem: Infection  Goal: Absence of Infection Signs and Symptoms  Outcome: Progressing

## 2025-02-04 NOTE — HPI
59 year old male with history of ADPKD (on hemodialysis) and recurrent UTIs who was transferred to [facility] for emphysematous pyelonephritis after being treated at OSH for urosepsis.

## 2025-02-04 NOTE — HPI
59-year-old male with a history of ADPKD, episodes of MARIZA superimposed on CKD (baseline creatinine appears to be around 6-7), hypertension, atrial fibrillation (on Xarelto), and UTIs admitted to [facility] January 25 with hiccups, nausea with vomiting, dizziness, poor oral intake, dysuria, hematuria, and flank pain. Flank pain was mostly on the left. He had no fever chills, no chest pain or dyspnea, and no evidence of GI bleeding. Chest x-ray and CT head had no acute abnormalities. Labs were concerning for leukocytosis, anemia, hyponatremia, and worsened renal function. CT of the abdomen and pelvis noted 59 yoM with pmh of ADPKD now on HD, HTN, afib (questionable xarelto hx), and UTIs who presents as a transfer for urological evaluation for emphysematous pyelonephritis after being treated at OSH for urosepsis. On arrival to [facility] he was resting comfortably in his bed. He reports that his only complaint at this time is abdominal soreness from his recent drain placement, otherwise he feels significantly improved since initially presenting. He explained that about a week ago he passed and that was when his family brought him in and the urosepsis, etc was found. E coli was isolated in both his urine and blood, but he has reportedly since cleared cultures. He currently denies any fever, chills, or SOB. He does report some cough with sputum that he apparently got a CXR for and it was clear. He has some intermittent N/V as well.    On arrival to [facility], he was HDS and AF. Most recent labs available were from 1/31 and showed: White blood cells 15.6, hemoglobin 7.3, hematocrit 24.5, platelets 231, sodium 134, potassium 4.2, chloride 101, CO2 26, BUN 56, creatinine 4.93, glucose 226. Upon arrival, labs and repeat infectious workup was ordered. Nephrology was consulted for HD needs and urology for possible nephrectomy. His ceftriaxone was continued, although I do not know the start date.

## 2025-02-04 NOTE — HPI
59 yoM with pmh of ADPKD now on HD, HTN, afib (questionable xarelto hx), and UTIs who presents as a transfer for urological evaluation for emphysematous pyelonephritis after being treated at OSH for urosepsis. On arrival to [facility] he was resting comfortably in his bed. He reports that his only complaint at this time is abdominal soreness from his recent drain placement, otherwise he feels significantly improved since initially presenting. He explained that about a week ago he passed out and that was when his family brought him in and the urosepsis, etc was found. E coli was isolated in both his urine and blood, but he has reportedly since cleared cultures. He currently denies any fever, chills, or SOB. He does report some cough with sputum that he apparently got a CXR for and it was clear. He has some intermittent N/V as well.     On arrival to [facility], he was HDS and AF. Most recent labs available were from 1/31 and showed: White blood cells 15.6, hemoglobin 7.3, hematocrit 24.5, platelets 231, sodium 134, potassium 4.2, chloride 101, CO2 26, BUN 56, creatinine 4.93, glucose 226. Upon arrival, labs and repeat infectious workup was ordered. Nephrology was consulted for HD needs and urology for possible nephrectomy. His ceftriaxone was continued, although I do not know the start date.

## 2025-02-04 NOTE — PHARMACY MED REC
"      Admission Medication History     The home medication history was taken by Milton Porter.    You may go to "Admission" then "Reconcile Home Medications" tabs to review and/or act upon these items.     The home medication list has been updated by the Pharmacy department.   Please read ALL comments highlighted in yellow.   Please address this information as you see fit.    Feel free to contact us if you have any questions or require assistance.      The medications listed below were removed from the home medication list. Please reorder if appropriate:  Patient reports no longer taking the following medication(s):  JYNARQUE 45 MG/15 MG  BENAZEPRIL 40 MG  CARVEDILOL 25 MG      Medications listed below were obtained from: Patient/family and [employer] Medications   Medication Sig    amiodarone (PACERONE) 200 MG Tab Take 200 mg by mouth three times daily.    amLODIPine (NORVASC) 5 MG tablet Take 5 mg by mouth once daily.    metoprolol succinate (TOPROL-XL) 25 MG 24 hr tablet Take 25 mg by mouth once daily.    pantoprazole (PROTONIX) 40 MG tablet Take 1 tablet by mouth once daily.    JYNARQUE 45 mg (AM)/ 15 mg (PM) TbSQ  (Patient not taking: Reported on 2/4/2025)    rivaroxaban (XARELTO) 20 mg Tab Take 0.5 tablets by mouth once daily. Only takes it when in AFIB due to kidney issues. Stopped before hospitalization on 01/25/25.       Potential issues to be addressed PRIOR TO DISCHARGE  Patient reported not taking the following medications: (JYNARQUE). These medications remain on the home medication list. Please address accordingly.   Patient requested refills for the following medications: (AMIODARONE 200 MG)    Milton Porter  [id number]         .          "

## 2025-02-05 LAB
ALBUMIN SERPL BCP-MCNC: 1.7 G/DL (ref 3.5–5.2)
ALP SERPL-CCNC: 71 U/L (ref 40–150)
ALT SERPL W/O P-5'-P-CCNC: 21 U/L (ref 10–44)
ANION GAP SERPL CALC-SCNC: 9 MMOL/L (ref 8–16)
AST SERPL-CCNC: 24 U/L (ref 10–40)
BACTERIA UR CULT: NORMAL
BACTERIA UR CULT: NORMAL
BASOPHILS # BLD AUTO: 0.04 K/UL (ref 0–0.2)
BASOPHILS NFR BLD: 0.3 % (ref 0–1.9)
BILIRUB SERPL-MCNC: 0.4 MG/DL (ref 0.1–1)
BUN SERPL-MCNC: 46 MG/DL (ref 8–23)
CALCIUM SERPL-MCNC: 7.5 MG/DL (ref 8.7–10.5)
CHLORIDE SERPL-SCNC: 98 MMOL/L (ref 95–110)
CO2 SERPL-SCNC: 20 MMOL/L (ref 23–29)
CREAT SERPL-MCNC: 5.7 MG/DL (ref 0.5–1.4)
DIFFERENTIAL METHOD BLD: ABNORMAL
EOSINOPHIL # BLD AUTO: 0.1 K/UL (ref 0–0.5)
EOSINOPHIL NFR BLD: 0.5 % (ref 0–8)
ERYTHROCYTE [DISTWIDTH] IN BLOOD BY AUTOMATED COUNT: 17.4 % (ref 11.5–14.5)
EST. GFR  (NO RACE VARIABLE): 10.6 ML/MIN/1.73 M^2
GLUCOSE SERPL-MCNC: 79 MG/DL (ref 70–110)
HCT VFR BLD AUTO: 25.8 % (ref 40–54)
HGB BLD-MCNC: 8 G/DL (ref 14–18)
IMM GRANULOCYTES # BLD AUTO: 0.12 K/UL (ref 0–0.04)
IMM GRANULOCYTES NFR BLD AUTO: 0.9 % (ref 0–0.5)
LYMPHOCYTES # BLD AUTO: 0.5 K/UL (ref 1–4.8)
LYMPHOCYTES NFR BLD: 3.4 % (ref 18–48)
MAGNESIUM SERPL-MCNC: 1.9 MG/DL (ref 1.6–2.6)
MCH RBC QN AUTO: 28.9 PG (ref 27–31)
MCHC RBC AUTO-ENTMCNC: 31 G/DL (ref 32–36)
MCV RBC AUTO: 93 FL (ref 82–98)
MONOCYTES # BLD AUTO: 1 K/UL (ref 0.3–1)
MONOCYTES NFR BLD: 7.4 % (ref 4–15)
NEUTROPHILS # BLD AUTO: 12.3 K/UL (ref 1.8–7.7)
NEUTROPHILS NFR BLD: 87.5 % (ref 38–73)
NRBC BLD-RTO: 0 /100 WBC
PHOSPHATE SERPL-MCNC: 6 MG/DL (ref 2.7–4.5)
PLATELET # BLD AUTO: 257 K/UL (ref 150–450)
PMV BLD AUTO: 10.1 FL (ref 9.2–12.9)
POTASSIUM SERPL-SCNC: 4.2 MMOL/L (ref 3.5–5.1)
PROT SERPL-MCNC: 5.5 G/DL (ref 6–8.4)
RBC # BLD AUTO: 2.77 M/UL (ref 4.6–6.2)
SODIUM SERPL-SCNC: 127 MMOL/L (ref 136–145)
WBC # BLD AUTO: 13.99 K/UL (ref 3.9–12.7)

## 2025-02-05 PROCEDURE — 63600175 PHARM REV CODE 636 W HCPCS: Mod: NTX

## 2025-02-05 PROCEDURE — 25000003 PHARM REV CODE 250: Mod: NTX | Performed by: FAMILY MEDICINE

## 2025-02-05 PROCEDURE — 99223 1ST HOSP IP/OBS HIGH 75: CPT | Mod: NTX,,, | Performed by: STUDENT IN AN ORGANIZED HEALTH CARE EDUCATION/TRAINING PROGRAM

## 2025-02-05 PROCEDURE — 85025 COMPLETE CBC W/AUTO DIFF WBC: CPT | Mod: NTX

## 2025-02-05 PROCEDURE — 5A1D70Z PERFORMANCE OF URINARY FILTRATION, INTERMITTENT, LESS THAN 6 HOURS PER DAY: ICD-10-PCS | Performed by: NURSE PRACTITIONER

## 2025-02-05 PROCEDURE — 83735 ASSAY OF MAGNESIUM: CPT | Mod: NTX

## 2025-02-05 PROCEDURE — 36415 COLL VENOUS BLD VENIPUNCTURE: CPT | Mod: NTX

## 2025-02-05 PROCEDURE — 84100 ASSAY OF PHOSPHORUS: CPT | Mod: NTX

## 2025-02-05 PROCEDURE — 80100014 HC HEMODIALYSIS 1:1: Mod: NTX

## 2025-02-05 PROCEDURE — 80053 COMPREHEN METABOLIC PANEL: CPT | Mod: NTX

## 2025-02-05 PROCEDURE — 20600001 HC STEP DOWN PRIVATE ROOM: Mod: NTX

## 2025-02-05 RX ORDER — SEVELAMER CARBONATE 800 MG/1
800 TABLET, FILM COATED ORAL
Status: DISCONTINUED | OUTPATIENT
Start: 2025-02-06 | End: 2025-02-06 | Stop reason: HOSPADM

## 2025-02-05 RX ORDER — SODIUM CHLORIDE 9 MG/ML
INJECTION, SOLUTION INTRAVENOUS ONCE
Status: CANCELLED | OUTPATIENT
Start: 2025-02-05 | End: 2025-02-05

## 2025-02-05 RX ORDER — MUPIROCIN 20 MG/G
OINTMENT TOPICAL 2 TIMES DAILY
Status: CANCELLED | OUTPATIENT
Start: 2025-02-05 | End: 2025-02-10

## 2025-02-05 RX ORDER — SODIUM CHLORIDE 9 MG/ML
INJECTION, SOLUTION INTRAVENOUS
Status: CANCELLED | OUTPATIENT
Start: 2025-02-05

## 2025-02-05 RX ADMIN — CEFTRIAXONE 2 G: 2 INJECTION, POWDER, FOR SOLUTION INTRAMUSCULAR; INTRAVENOUS at 06:02

## 2025-02-05 RX ADMIN — AMIODARONE HYDROCHLORIDE 200 MG: 200 TABLET ORAL at 09:02

## 2025-02-05 NOTE — ASSESSMENT & PLAN NOTE
59 yoM with pmh of ADPKD now on HD, HTN, afib (questionable xarelto hx), and UTIs who presents as a transfer for urological evaluation for emphysematous pyelonephritis after being treated at OSH for urosepsis. Pt was admitted to OSH on 1/25, found to have MARIZA on CKD as well as emphysematous pyelonephritis. Renal function worsened with Cr increasing to 12 per pt and he developed uremia. Temporary HD line placed in Right IJ and HD initiated. [place] [facility] [place] to assess need for further intervention. Nephrology has been consulted to manage inpatient HD. Pt has increased LE edema on initial eval, but denies any SOB, and shows no signs of uremia. Last HD session 2 days ago per pt.    Plan:  -Renal function stable, continue to follow daily labs  -Plan to initiate inpatient HD today

## 2025-02-05 NOTE — ASSESSMENT & PLAN NOTE
Patient's blood pressure range in the last 24 hours was: BP  Min: 124/70  Max: 155/81.The patient's inpatient anti-hypertensive regimen is listed below:  Current Antihypertensives  metoprolol succinate (TOPROL-XL) 24 hr tablet 25 mg, Every evening, Oral  , Daily, OralToprol 25  Amlodipine 5      BP Readings from Last 1 Encounters:   02/05/25 (!) 143/73         Plan  - Restarted Toprol 25 mg daily  - Will hold amlodipine until additional BP control required

## 2025-02-05 NOTE — HPI
"Mr. Phillip is a 59M with PMH of ADPKD with recurrent UTIs, ESRD on HD, afib, initially presented to [facility] with dysuria and flank pain, found to have emphysematous pyelonephritis, transferred to [facility] for urology services, now s/p drain placement x 3. Infectious disease consulted for "E coli bacteremia, emphysematous pyelonephritis in ADPKD, 3 drains placed. Transferred for urology eval for nephrectomy. No surgery planned. Now ESRD on HD. Assistance w Abx please".     Patient appears to have improved since drain placement, per urology no further intervention. Based on UCX from OSH, E coli was resistant to FQs. Blood and urine cultures from here NGTD.       "

## 2025-02-05 NOTE — PROGRESS NOTES
[facility] - Telemetry Stepdown  Nephrology  Progress Note    Patient Name: Kenneth Phillip  MRN: [id number]  Admission Date: 2/4/2025  Hospital Length of Stay: 1 days  Attending Provider: Sergio Bloom III*   Primary Care Physician: Марина, Primary Doctor  Principal Problem:Emphysematous pyelonephritis    Subjective:     HPI: 59 yoM with pmh of ADPKD now on HD, HTN, afib (questionable xarelto hx), and UTIs who presents as a transfer for urological evaluation for emphysematous pyelonephritis after being treated at OSH for urosepsis. On arrival to [facility] he was resting comfortably in his bed. He reports that his only complaint at this time is abdominal soreness from his recent drain placement, otherwise he feels significantly improved since initially presenting. He explained that about a week ago he passed out and that was when his family brought him in and the urosepsis, etc was found. E coli was isolated in both his urine and blood, but he has reportedly since cleared cultures. He currently denies any fever, chills, or SOB. He does report some cough with sputum that he apparently got a CXR for and it was clear. He has some intermittent N/V as well.     On arrival to [facility], he was HDS and AF. Most recent labs available were from 1/31 and showed: White blood cells 15.6, hemoglobin 7.3, hematocrit 24.5, platelets 231, sodium 134, potassium 4.2, chloride 101, CO2 26, BUN 56, creatinine 4.93, glucose 226. Upon arrival, labs and repeat infectious workup was ordered. Nephrology was consulted for HD needs and urology for possible nephrectomy. His ceftriaxone was continued, although I do not know the start date.    Interval History: NAEO, volume status similar to yesterday and renal function stable. Planned for HD today.    Review of patient's allergies indicates:   Allergen Reactions    Penicillins Other (See Comments)     Current Facility-Administered Medications   Medication Frequency    acetaminophen tablet 650 mg Q4H PRN     albuterol inhaler 2 puff Q6H PRN    albuterol-ipratropium 2.5 mg-0.5 mg/3 mL nebulizer solution 3 mL Q6H PRN    amiodarone tablet 200 mg Daily    cefTRIAXone injection 2 g Q24H    dextrose 50% injection 12.5 g PRN    dextrose 50% injection 25 g PRN    glucagon (human recombinant) injection 1 mg PRN    glucose chewable tablet 16 g PRN    glucose chewable tablet 24 g PRN    melatonin tablet 6 mg Nightly PRN    metoprolol succinate (TOPROL-XL) 24 hr tablet 25 mg Daily PM    naloxone 0.4 mg/mL injection 0.02 mg PRN    ondansetron injection 4 mg Q8H PRN    oxyCODONE immediate release tablet 5 mg Q4H PRN    sodium chloride 0.9% flush 10 mL Q12H PRN       Objective:     Vital Signs (Most Recent):  Temp: 96.9 °F (36.1 °C) (02/05/25 1138)  Pulse: 73 (02/05/25 1138)  Resp: (!) 22 (02/05/25 1138)  BP: (!) 123/57 (02/05/25 1138)  SpO2: 99 % (02/05/25 1138) Vital Signs (24h Range):  Temp:  [96.9 °F (36.1 °C)-99.1 °F (37.3 °C)] 96.9 °F (36.1 °C)  Pulse:  [64-78] 73  Resp:  [18-22] 22  SpO2:  [92 %-99 %] 99 %  BP: (123-155)/(57-81) 123/57     Weight: 109 kg (240 lb 4.8 oz) (02/05/25 0013)  Body mass index is 36.54 kg/m².  Body surface area is 2.29 meters squared.    I/O last 3 completed shifts:  In: 880 [P.O.:880]  Out: 4041 [Urine:3825; Drains:215; Stool:1]     Physical Exam  HENT:      Head: Normocephalic and atraumatic.   Eyes:      Extraocular Movements: Extraocular movements intact.      Pupils: Pupils are equal, round, and reactive to light.   Cardiovascular:      Rate and Rhythm: Normal rate and regular rhythm.      Pulses: Normal pulses.      Heart sounds: Normal heart sounds.   Pulmonary:      Effort: Pulmonary effort is normal.      Breath sounds: Normal breath sounds.   Abdominal:      General: Bowel sounds are normal.   Musculoskeletal:      Right lower leg: Edema present.      Left lower leg: Edema present.   Neurological:      Mental Status: He is alert.          Significant Labs:  CBC:   Recent Labs   Lab  02/05/25  0510   WBC 13.99*   RBC 2.77*   HGB 8.0*   HCT 25.8*      MCV 93   MCH 28.9   MCHC 31.0*     CMP:   Recent Labs   Lab 02/05/25  0510   GLU 79   CALCIUM 7.5*   ALBUMIN 1.7*   PROT 5.5*   *   K 4.2   CO2 20*   CL 98   BUN 46*   CREATININE 5.7*   ALKPHOS 71   ALT 21   AST 24   BILITOT 0.4     All labs within the past 24 hours have been reviewed.     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 Pt was admitted to OSH on 1/25, found to have MARIZA on CKD as well as emphysematous pyelonephritis. Renal function worsened with Cr increasing to 12 per pt and he developed uremia. Temporary HD line placed in Right IJ and HD initiated. Pt now [place] [facility] [place] to assess need for further intervention. Nephrology has been consulted to manage inpatient HD. Pt has increased LE edema on initial eval, but denies any SOB, and shows no signs of uremia. Last HD session 2 days ago per pt.    Plan:  -Renal function stable, continue to follow daily labs  -Plan to initiate inpatient HD today        Thank you for your consult. I will follow-up with patient. Please contact us if you have any additional questions.    Latrell Ayoub MD  Nephrology  [facility] - Telemetry Stepdown

## 2025-02-05 NOTE — SUBJECTIVE & OBJECTIVE
Interval History: No acute overnight events. Patient denies fevers or chills. Endorses some left lower quadrant pain near drain sites. Drains appropriately serosanguinous. Urinating spontaneously.    Review of Systems see interval history above  Objective:     Temp:  [97.9 °F (36.6 °C)-99.1 °F (37.3 °C)] 97.9 °F (36.6 °C)  Pulse:  [64-77] 64  Resp:  [18] 18  SpO2:  [92 %-97 %] 92 %  BP: (122-155)/(68-81) 124/70     Body mass index is 36.54 kg/m².    Drains       Drain  Duration             Trialysis (Dialysis) Catheter 02/03/25 0130 right internal jugular 2 days         Closed/Suction Drain 02/04/25 0130 Tube - 1 Lateral LUQ Bulb 1 day         Closed/Suction Drain 02/04/25 0130 Tube - 2 Lateral LLQ Bulb 1 day         Closed/Suction Drain 02/04/25 0130 Tube - 3 Lateral LLQ Bulb 1 day                     Physical Exam  Vitals reviewed.   Constitutional:       General: He is awake. He is not in acute distress.     Interventions: Nasal cannula in place.   Cardiovascular:      Rate and Rhythm: Normal rate.   Pulmonary:      Effort: Pulmonary effort is normal. No respiratory distress.   Abdominal:      Comments: Obese, non tender. 3 JACK drains with serosanguinous output in LLQ.    Genitourinary:     Comments: Voiding spontaneously, no guardado catheter in place  Neurological:      General: No focal deficit present.      Mental Status: He is alert and oriented to person, place, and time.           Significant Labs:    BMP:  Recent Labs   Lab 02/04/25  1045 02/05/25  0510   * 127*   K 4.3 4.2   CL 98 98   CO2 21* 20*   BUN 45* 46*   CREATININE 5.2* 5.7*   CALCIUM 7.6* 7.5*       CBC:   Recent Labs   Lab 02/04/25  1045 02/05/25  0510   WBC 16.24* 13.99*   HGB 8.5* 8.0*   HCT 28.3* 25.8*    257     Urine Studies:   Recent Labs   Lab 02/04/25  1046   COLORU Yellow   APPEARANCEUA Cloudy*   PHUR 7.0   SPECGRAV 1.010   PROTEINUA 2+*   GLUCUA Negative   KETONESU Negative   BILIRUBINUA Negative   OCCULTUA 3+*   NITRITE  Negative   LEUKOCYTESUR 3+*   RBCUA 21*   WBCUA >100*   BACTERIA Moderate*   SQUAMEPITHEL 1   HYALINECASTS 0       Significant Imaging:  All pertinent imaging results/findings from the past 24 hours have been reviewed.

## 2025-02-05 NOTE — CONSULTS
"[facility] - Telemetry Stepdown  Infectious Disease  Consult Note    Patient Name: Kenneth Phillip  MRN: [id number]  Admission Date: 2/4/2025  Hospital Length of Stay: 1 days  Attending Physician: Sergio Bloom III*  Primary Care Provider: No, Primary Doctor     Isolation Status: No active isolations    Patient information was obtained from patient, spouse/SO, and ER records.      Inpatient consult to Infectious Diseases  Consult performed by: Nicole Jennings DO  Consult ordered by: Sergio Bloom III, MD        Assessment/Plan:     ID  * Emphysematous pyelonephritis  59M with PMH of ADPKD with recurrent UTIs, now ESRD on HD, initially presented to [facility] with dysuria and flank pain, found to have emphysematous pyelonephritis s/p 3 drains placed, transferred to [facility] for urology services. Does have another fluid collection inferior to drain, no further intervention planned by urology. Ecoli from OSH susceptible to cefazolin, ceftriaxone, cefepime, zosyn; resistant to FQs. Planning to be transferred back to [facility].     Recommendations  Continue ceftriaxone for now  Will need at least 2 weeks of antibiotics, and follow up imaging towards the end of the course prior to discontinuing treatment and to assist with drain removal  Can be managed by ID provider closer to [place]        Thank you for your consult. I will sign off. Please contact us if you have any additional questions.    Nicole Jennings DO  Infectious Disease  [facility] - Critical access hospital Stepdown    Subjective:     Principal Problem: Emphysematous pyelonephritis    HPI: Mr. Phillip is a 59M with PMH of ADPKD with recurrent UTIs, ESRD on HD, afib, initially presented to [facility] with dysuria and flank pain, found to have emphysematous pyelonephritis, transferred to [facility] for urology services, now s/p drain placement x 3. Infectious disease consulted for "E coli bacteremia, emphysematous pyelonephritis in ADPKD, " "3 drains placed. Transferred for urology eval for nephrectomy. No surgery planned. Now ESRD on HD. Assistance w Abx please".     Patient appears to have improved since drain placement, per urology no further intervention. Based on UCX from OSH, E coli was resistant to FQs. Blood and urine cultures from here NGTD.         Past Medical History:   Diagnosis Date    Anemia     Atrial fibrillation     Disorder of kidney and ureter     GERD (gastroesophageal reflux disease)     Hypertension     Obesity     Polycystic kidney disease        Past Surgical History:   Procedure Laterality Date    CARDIAC ELECTROPHYSIOLOGY STUDY AND ABLATION      TOTAL KNEE ARTHROPLASTY Right        Review of patient's allergies indicates:   Allergen Reactions    Penicillins Other (See Comments)       Medications:  Medications Prior to Admission   Medication Sig    amiodarone (PACERONE) 200 MG Tab Take 200 mg by mouth 3 (three) times daily.    amLODIPine (NORVASC) 5 MG tablet Take 5 mg by mouth once daily.    metoprolol succinate (TOPROL-XL) 25 MG 24 hr tablet Take 25 mg by mouth once daily.    pantoprazole (PROTONIX) 40 MG tablet Take 1 tablet by mouth once daily.    JYNARQUE 45 mg (AM)/ 15 mg (PM) TbSQ  (Patient not taking: Reported on 2/4/2025)    rivaroxaban (XARELTO) 20 mg Tab Take 1 tablet by mouth once daily. (Patient taking differently: Take 0.5 tablets by mouth once daily. Only takes it when in AFIB due to kidney issues. Stopped before hospitalization on 01/25/25.)     Antibiotics (From admission, onward)      Start     Stop Route Frequency Ordered    02/04/25 0515  cefTRIAXone injection 2 g         -- IV Every 24 hours (non-standard times) 02/04/25 0411          Antifungals (From admission, onward)      None          Antivirals (From admission, onward)      None             Immunization History   Administered Date(s) Administered    COVID-19, MRNA, LN-S, PF (MODERNA FULL 0.5 ML DOSE) 10/20/2022    COVID-19, MRNA, LN-S, PF (Pfizer) " (Purple Cap) 2021, 2021, 12/10/2021       Family History       Problem Relation (Age of Onset)    Kidney disease Father, Sister, Son    Polycystic kidney disease Father, Sister, Son          Social History     Socioeconomic History    Marital status: Unknown   Tobacco Use    Smoking status: Former     Current packs/day: 0.00     Types: Cigarettes     Quit date: 2013     Years since quittin.3    Smokeless tobacco: Never     Social Drivers of Health     Food Insecurity: No Food Insecurity (2024)    Received from [facility] Vital Sign     Worried About Running Out of Food in the Last Year: Never true     Ran Out of Food in the Last Year: Never true     Review of Systems   Constitutional:  Negative for chills and fever.   Respiratory:  Negative for cough and shortness of breath.    Cardiovascular:  Negative for chest pain.   Gastrointestinal:  Negative for abdominal pain, constipation, diarrhea, nausea and vomiting.   Genitourinary:  Positive for flank pain. Negative for dysuria and hematuria.   Musculoskeletal:  Negative for arthralgias and myalgias.   Skin:  Negative for rash.   Neurological:  Negative for weakness and headaches.     Objective:     Vital Signs (Most Recent):  Temp: 96.9 °F (36.1 °C) (25 1138)  Pulse: 73 (25 1138)  Resp: (!) 22 (25 1138)  BP: (!) 123/57 (25 1138)  SpO2: 99 % (25 1138) Vital Signs (24h Range):  Temp:  [96.9 °F (36.1 °C)-99.1 °F (37.3 °C)] 96.9 °F (36.1 °C)  Pulse:  [64-78] 73  Resp:  [18-22] 22  SpO2:  [92 %-99 %] 99 %  BP: (123-155)/(57-81) 123/57     Weight: 109 kg (240 lb 4.8 oz)  Body mass index is 36.54 kg/m².    Estimated Creatinine Clearance: 16.3 mL/min (A) (based on SCr of 5.7 mg/dL (H)).     Physical Exam  Vitals reviewed.   Constitutional:       General: He is not in acute distress.     Appearance: Normal appearance. He is not ill-appearing.   HENT:      Head: Normocephalic and atraumatic.   Eyes:       Extraocular Movements: Extraocular movements intact.      Conjunctiva/sclera: Conjunctivae normal.   Cardiovascular:      Rate and Rhythm: Normal rate and regular rhythm.      Heart sounds: No murmur heard.  Pulmonary:      Effort: Pulmonary effort is normal. No respiratory distress.      Breath sounds: Normal breath sounds.   Abdominal:      General: Abdomen is flat. Bowel sounds are normal.      Palpations: Abdomen is soft.      Tenderness: There is no abdominal tenderness.      Comments: 3 drains in place on the left   Musculoskeletal:      Cervical back: Normal range of motion.   Skin:     General: Skin is warm and dry.   Neurological:      General: No focal deficit present.      Mental Status: He is alert and oriented to person, place, and time.   Psychiatric:         Mood and Affect: Mood normal.         Behavior: Behavior normal.         Thought Content: Thought content normal.          Significant Labs: All pertinent labs within the past 24 hours have been reviewed.  Recent Lab Results         02/05/25  0510        Albumin 1.7       ALP 71       ALT 21       Anion Gap 9       AST 24       Baso # 0.04       Basophil % 0.3       BILIRUBIN TOTAL 0.4  Comment: For infants and newborns, interpretation of results should be based  on gestational age, weight and in agreement with clinical  observations.    Premature Infant recommended reference ranges:  Up to 24 hours.............<8.0 mg/dL  Up to 48 hours............<12.0 mg/dL  3-5 days..................<15.0 mg/dL  6-29 days.................<15.0 mg/dL         BUN 46       Calcium 7.5       Chloride 98       CO2 20       Creatinine 5.7       Differential Method Automated       eGFR 10.6       Eos # 0.1       Eos % 0.5       Glucose 79       Gran # (ANC) 12.3       Gran % 87.5       Hematocrit 25.8       Hemoglobin 8.0       Immature Grans (Abs) 0.12  Comment: Mild elevation in immature granulocytes is non specific and   can be seen in a variety of conditions  including stress response,   acute inflammation, trauma and pregnancy. Correlation with other   laboratory and clinical findings is essential.         Immature Granulocytes 0.9       Lymph # 0.5       Lymph % 3.4       Magnesium  1.9       MCH 28.9       MCHC 31.0       MCV 93       Mono # 1.0       Mono % 7.4       MPV 10.1       nRBC 0       Phosphorus Level 6.0       Platelet Count 257       Potassium 4.2       PROTEIN TOTAL 5.5       RBC 2.77       RDW 17.4       Sodium 127       WBC 13.99               Significant Imaging: I have reviewed all pertinent imaging results/findings within the past 24 hours.

## 2025-02-05 NOTE — ASSESSMENT & PLAN NOTE
60yo M with history of ADPKD (on hemodialysis), admitted for emphysematous pyelonephritis.    - Patient seen and examined  - JACK drains with appropriate serosanguinous output  - No indication for urgent urologic intervention. Patient clinically improved s/p drain placement    - Maintain drains  - Recommend guardado catheter for maximal drainage, patient refusing at this time   - PVRs 0, no need for serial PVRs  - Agree with broad spectrum antibiotics   - Patient should follow up outpatient with urology in [place]    Urology will sign off at this time. Please contact for any questions or concerns.

## 2025-02-05 NOTE — PROGRESS NOTES
[facility] - Telemetry Stepdown  Urology  Progress Note    Patient Name: Kenneth Phillip  MRN: [id number]  Admission Date: 2/4/2025  Hospital Length of Stay: 1 days  Code Status: Full Code   Attending Provider: Sergio Bloom III*   Primary Care Physician: Марина, Primary Doctor    Subjective:     HPI:  59 year old male with history of ADPKD (on hemodialysis) and recurrent UTIs who [place] [facility] [place] after being treated at OSH for urosepsis.    Interval History: No acute overnight events. Patient denies fevers or chills. Endorses some left lower quadrant pain near drain sites. Drains appropriately serosanguinous. Urinating spontaneously.    Review of Systems see interval history above  Objective:     Temp:  [97.9 °F (36.6 °C)-99.1 °F (37.3 °C)] 97.9 °F (36.6 °C)  Pulse:  [64-77] 64  Resp:  [18] 18  SpO2:  [92 %-97 %] 92 %  BP: (122-155)/(68-81) 124/70     Body mass index is 36.54 kg/m².    Drains       Drain  Duration             Trialysis (Dialysis) Catheter 02/03/25 0130 right internal jugular 2 days         Closed/Suction Drain 02/04/25 0130 Tube - 1 Lateral LUQ Bulb 1 day         Closed/Suction Drain 02/04/25 0130 Tube - 2 Lateral LLQ Bulb 1 day         Closed/Suction Drain 02/04/25 0130 Tube - 3 Lateral LLQ Bulb 1 day                     Physical Exam  Vitals reviewed.   Constitutional:       General: He is awake. He is not in acute distress.     Interventions: Nasal cannula in place.   Cardiovascular:      Rate and Rhythm: Normal rate.   Pulmonary:      Effort: Pulmonary effort is normal. No respiratory distress.   Abdominal:      Comments: Obese, non tender. 3 JACK drains with serosanguinous output in LLQ.    Genitourinary:     Comments: Voiding spontaneously, no guardado catheter in place  Neurological:      General: No focal deficit present.      Mental Status: He is alert and oriented to person, place, and time.           Significant Labs:    BMP:  Recent Labs   Lab  02/04/25  1045 02/05/25  0510   * 127*   K 4.3 4.2   CL 98 98   CO2 21* 20*   BUN 45* 46*   CREATININE 5.2* 5.7*   CALCIUM 7.6* 7.5*       CBC:   Recent Labs   Lab 02/04/25  1045 02/05/25  0510   WBC 16.24* 13.99*   HGB 8.5* 8.0*   HCT 28.3* 25.8*    257     Urine Studies:   Recent Labs   Lab 02/04/25  1046   COLORU Yellow   APPEARANCEUA Cloudy*   PHUR 7.0   SPECGRAV 1.010   PROTEINUA 2+*   GLUCUA Negative   KETONESU Negative   BILIRUBINUA Negative   OCCULTUA 3+*   NITRITE Negative   LEUKOCYTESUR 3+*   RBCUA 21*   WBCUA >100*   BACTERIA Moderate*   SQUAMEPITHEL 1   HYALINECASTS 0       Significant Imaging:  All pertinent imaging results/findings from the past 24 hours have been reviewed.      Assessment/Plan:     * Emphysematous pyelonephritis  58yo M with history of ADPKD (on hemodialysis), admitted for emphysematous pyelonephritis.    - Patient seen and examined  - JACK drains with appropriate serosanguinous output  - No indication for urgent urologic intervention. Patient clinically improved s/p drain placement    - Maintain drains  - Recommend guardado catheter for maximal drainage, patient refusing at this time   - PVRs 0, no need for serial PVRs  - Agree with broad spectrum antibiotics   - Patient should follow up outpatient with urology in [place]    Urology will sign off at this time. Please contact for any questions or concerns.        VTE Risk Mitigation (From admission, onward)           Ordered     IP VTE HIGH RISK PATIENT  Once         02/04/25 0431     Place sequential compression device  Until discontinued         02/04/25 0431     Reason for No Pharmacological VTE Prophylaxis  Once        Question:  Reasons:  Answer:  Physician Provided (leave comment)  Comment:  possible surgery    02/04/25 0431                  Alicia Miramontes, DO  Urology  [facility] - Telemetry Stepdown

## 2025-02-05 NOTE — HOSPITAL COURSE
59M with PMH ADPKD with recurrent UTIs, CKD V now ESRD and HD dependent since before transfer, AFib on Xarelto admitted to OSH with sepsis d/t emphysematous pyelonephritis. [place] [facility] [place] potential surgical intervention including nephrectomy. Decompensated at OSH, needed MICU, Lt renal drains placed x3 which remain present, new ESRD. Urology consulted, no intervention recommended, pt clinically imrpoved s/p drain placement. Outpt follow up.     [facility] Ongoing medical needs include: placement of tunneled dialysis catheter, continued inpatient HD and organization of outpatient HD, continued IV antibiotics, continued drain management and further evaluation of new 6.0 x 2.8 cm fluid collection posterolateral perirenal space noted on CT.

## 2025-02-05 NOTE — SUBJECTIVE & OBJECTIVE
Past Medical History:   Diagnosis Date    Anemia     Atrial fibrillation     Disorder of kidney and ureter     GERD (gastroesophageal reflux disease)     Hypertension     Obesity     Polycystic kidney disease        Past Surgical History:   Procedure Laterality Date    CARDIAC ELECTROPHYSIOLOGY STUDY AND ABLATION      TOTAL KNEE ARTHROPLASTY Right        Review of patient's allergies indicates:   Allergen Reactions    Penicillins Other (See Comments)       Medications:  Medications Prior to Admission   Medication Sig    amiodarone (PACERONE) 200 MG Tab Take 200 mg by mouth 3 (three) times daily.    amLODIPine (NORVASC) 5 MG tablet Take 5 mg by mouth once daily.    metoprolol succinate (TOPROL-XL) 25 MG 24 hr tablet Take 25 mg by mouth once daily.    pantoprazole (PROTONIX) 40 MG tablet Take 1 tablet by mouth once daily.    JYNARQUE 45 mg (AM)/ 15 mg (PM) TbSQ  (Patient not taking: Reported on 2/4/2025)    rivaroxaban (XARELTO) 20 mg Tab Take 1 tablet by mouth once daily. (Patient taking differently: Take 0.5 tablets by mouth once daily. Only takes it when in AFIB due to kidney issues. Stopped before hospitalization on 01/25/25.)     Antibiotics (From admission, onward)      Start     Stop Route Frequency Ordered    02/04/25 0515  cefTRIAXone injection 2 g         -- IV Every 24 hours (non-standard times) 02/04/25 0411          Antifungals (From admission, onward)      None          Antivirals (From admission, onward)      None             Immunization History   Administered Date(s) Administered    COVID-19, MRNA, LN-S, PF (MODERNA FULL 0.5 ML DOSE) 10/20/2022    COVID-19, MRNA, LN-S, PF (Pfizer) (Purple Cap) 03/19/2021, 04/18/2021, 12/10/2021       Family History       Problem Relation (Age of Onset)    Kidney disease Father, Sister, Son    Polycystic kidney disease Father, Sister, Son          Social History     Socioeconomic History    Marital status: Unknown   Tobacco Use    Smoking status: Former     Current  packs/day: 0.00     Types: Cigarettes     Quit date: 2013     Years since quittin.3    Smokeless tobacco: Never     Social Drivers of Health     Food Insecurity: No Food Insecurity (2024)    Received from [facility] Vital Sign     Worried About Running Out of Food in the Last Year: Never true     Ran Out of Food in the Last Year: Never true     Review of Systems   Constitutional:  Negative for chills and fever.   Respiratory:  Negative for cough and shortness of breath.    Cardiovascular:  Negative for chest pain.   Gastrointestinal:  Negative for abdominal pain, constipation, diarrhea, nausea and vomiting.   Genitourinary:  Positive for flank pain. Negative for dysuria and hematuria.   Musculoskeletal:  Negative for arthralgias and myalgias.   Skin:  Negative for rash.   Neurological:  Negative for weakness and headaches.     Objective:     Vital Signs (Most Recent):  Temp: 96.9 °F (36.1 °C) (25 1138)  Pulse: 73 (25 1138)  Resp: (!) 22 (25 1138)  BP: (!) 123/57 (25 1138)  SpO2: 99 % (25 1138) Vital Signs (24h Range):  Temp:  [96.9 °F (36.1 °C)-99.1 °F (37.3 °C)] 96.9 °F (36.1 °C)  Pulse:  [64-78] 73  Resp:  [18-22] 22  SpO2:  [92 %-99 %] 99 %  BP: (123-155)/(57-81) 123/57     Weight: 109 kg (240 lb 4.8 oz)  Body mass index is 36.54 kg/m².    Estimated Creatinine Clearance: 16.3 mL/min (A) (based on SCr of 5.7 mg/dL (H)).     Physical Exam  Vitals reviewed.   Constitutional:       General: He is not in acute distress.     Appearance: Normal appearance. He is not ill-appearing.   HENT:      Head: Normocephalic and atraumatic.   Eyes:      Extraocular Movements: Extraocular movements intact.      Conjunctiva/sclera: Conjunctivae normal.   Cardiovascular:      Rate and Rhythm: Normal rate and regular rhythm.      Heart sounds: No murmur heard.  Pulmonary:      Effort: Pulmonary effort is normal. No respiratory distress.      Breath sounds: Normal breath  sounds.   Abdominal:      General: Abdomen is flat. Bowel sounds are normal.      Palpations: Abdomen is soft.      Tenderness: There is no abdominal tenderness.      Comments: 3 drains in place on the left   Musculoskeletal:      Cervical back: Normal range of motion.   Skin:     General: Skin is warm and dry.   Neurological:      General: No focal deficit present.      Mental Status: He is alert and oriented to person, place, and time.   Psychiatric:         Mood and Affect: Mood normal.         Behavior: Behavior normal.         Thought Content: Thought content normal.          Significant Labs: All pertinent labs within the past 24 hours have been reviewed.  Recent Lab Results         02/05/25  0510        Albumin 1.7       ALP 71       ALT 21       Anion Gap 9       AST 24       Baso # 0.04       Basophil % 0.3       BILIRUBIN TOTAL 0.4  Comment: For infants and newborns, interpretation of results should be based  on gestational age, weight and in agreement with clinical  observations.    Premature Infant recommended reference ranges:  Up to 24 hours.............<8.0 mg/dL  Up to 48 hours............<12.0 mg/dL  3-5 days..................<15.0 mg/dL  6-29 days.................<15.0 mg/dL         BUN 46       Calcium 7.5       Chloride 98       CO2 20       Creatinine 5.7       Differential Method Automated       eGFR 10.6       Eos # 0.1       Eos % 0.5       Glucose 79       Gran # (ANC) 12.3       Gran % 87.5       Hematocrit 25.8       Hemoglobin 8.0       Immature Grans (Abs) 0.12  Comment: Mild elevation in immature granulocytes is non specific and   can be seen in a variety of conditions including stress response,   acute inflammation, trauma and pregnancy. Correlation with other   laboratory and clinical findings is essential.         Immature Granulocytes 0.9       Lymph # 0.5       Lymph % 3.4       Magnesium  1.9       MCH 28.9       MCHC 31.0       MCV 93       Mono # 1.0       Mono % 7.4       MPV  10.1       nRBC 0       Phosphorus Level 6.0       Platelet Count 257       Potassium 4.2       PROTEIN TOTAL 5.5       RBC 2.77       RDW 17.4       Sodium 127       WBC 13.99               Significant Imaging: I have reviewed all pertinent imaging results/findings within the past 24 hours.

## 2025-02-05 NOTE — PLAN OF CARE
Initiated transfer back to [facility]. Awaiting acceptance    Elvin Danielle [facility]    [facility]  [phone]

## 2025-02-05 NOTE — PLAN OF CARE
Problem: Adult Inpatient Plan of Care  Goal: Plan of Care Review  Outcome: Progressing  Goal: Patient-Specific Goal (Individualized)  Outcome: Progressing  Goal: Absence of Hospital-Acquired Illness or Injury  Outcome: Progressing  Goal: Optimal Comfort and Wellbeing  Outcome: Progressing  Goal: Readiness for Transition of Care  Outcome: Progressing     Problem: Infection  Goal: Absence of Infection Signs and Symptoms  Outcome: Progressing     Problem: Hemodialysis  Goal: Safe, Effective Therapy Delivery  Outcome: Progressing  Goal: Effective Tissue Perfusion  Outcome: Progressing  Goal: Absence of Infection Signs and Symptoms  Outcome: Progressing     Problem: Fall Injury Risk  Goal: Absence of Fall and Fall-Related Injury  Outcome: Progressing  Intervention: Promote Injury-Free Environment  Flowsheets (Taken 2/5/2025 4012)  Safety Promotion/Fall Prevention:   assistive device/personal item within reach   bed alarm refused   Fall Risk reviewed with patient/family   family to remain at bedside   nonskid shoes/socks when out of bed   side rails raised x 2     POC in progress. Address questions and concerns. AAOX4. VVS. Up to BR. Free from and injuries. Awaiting HD after discharge to home. No c/o of discomfort or distress. Call light in reach. [email or web address].

## 2025-02-05 NOTE — PLAN OF CARE
[facility] - Telemetry Stepdown  Initial Discharge Assessment       Primary Care Provider: MERLENE Higgins [facility]    Admission Diagnosis: Pyelonephritis [N12]    Admission Date: 2/4/2025  Expected Discharge Date: 2/11/2025    Transition of Care Barriers: None    Payor: [employer] / Plan: [employer] / Product Type: Managed Care Transplant /     Extended Emergency Contact Information  Primary Emergency Contact: Odette phillip  Mobile Phone: [phone]  Relation: Spouse    Discharge Plan A: Home with family, Home Health  Discharge Plan B: Home with family      [facility] [address] [place] [id number]  Phone: [phone] Fax: [phone]        CM met with patient and Odette Phillip (spouse) [phone]  in room for Discharge Planning Assessment.  Patient able to answer questions.  Per patient, he lives with Odette Phillip (spouse) [phone]  in a mobile home with 3 step(s) to enter.   Per Odette, patient was semi-independent with ADLS and used rolling walker and straight cane for ambulation. Per Odette, patient is not on dialysis, and does not take Coumadin, takes Xeralto.  PCP and pharmacy verified. Patient does not have home health, and has not been hospitalized in past 30 days.  Patient will have help from Odette Phillip (spouse) [phone]  upon discharge.   Discharge Planning discussed.  All questions addressed.  CM will follow for needs.    Discharge Plan A and Plan B have been determined by review of patient's clinical status, future medical and therapeutic needs, and coverage/benefits for post-acute care in coordination with multidisciplinary team members.        Initial Assessment (most recent)       Adult Discharge Assessment - 02/05/25 1548          Discharge Assessment    Assessment Type Discharge Planning Assessment     Confirmed/corrected address, phone number and insurance Yes     Confirmed Demographics Correct on Facesheet      Source of Information patient;family     When was your last doctors appointment? 01/13/25     Communicated BECCA with patient/caregiver Date not available/Unable to determine     Reason For Admission Emphysematous pyelonephritis     People in Home spouse     Facility Arrived From: [facility]     Do you expect to return to your current living situation? Yes     Do you have help at home or someone to help you manage your care at home? Yes     Who are your caregiver(s) and their phone number(s)? Odette Marjan (spouse) [phone]     Prior to hospitilization cognitive status: Alert/Oriented     Current cognitive status: Alert/Oriented     Walking or Climbing Stairs Difficulty yes     Walking or Climbing Stairs ambulation difficulty, requires equipment     Mobility Management rolling walker , straight cane     Dressing/Bathing Difficulty yes     Dressing/Bathing bathing difficulty, assistance 1 person;dressing difficulty, assistance 1 person     Dressing/Bathing Management spouse assists in these tasks     Equipment Currently Used at Home cane, straight;walker, rolling     Readmission within 30 days? No     Patient currently being followed by outpatient case management? No     Do you currently have service(s) that help you manage your care at home? No     Do you take prescription medications? Yes     Do you have prescription coverage? Yes     Coverage [employer] - [employer]     Do you have any problems affording any of your prescribed medications? TBD     Is the patient taking medications as prescribed? yes     Who is going to help you get home at discharge? Odette Phillip (spouse) [phone]     How do you get to doctors appointments? car, drives self;family or friend will provide     Are you on dialysis? No     Do you take coumadin? No   takes Xeralto    Discharge Plan A Home with family;Home Health     Discharge Plan B Home with family     DME Needed Upon Discharge  other (see comments)    TBD    Discharge Plan discussed with: Spouse/sig other;Patient     Name(s) and Number(s) Odette Phillip (spouse) [phone]     Transition of Care Barriers None        Physical Activity    On average, how many days per week do you engage in moderate to strenuous exercise (like a brisk walk)? 0 days     On average, how many minutes do you engage in exercise at this level? 0 min        Financial Resource Strain    How hard is it for you to pay for the very basics like food, housing, medical care, and heating? Somewhat hard        Housing Stability    In the last 12 months, was there a time when you were not able to pay the mortgage or rent on time? No     At any time in the past 12 months, were you homeless or living in a shelter (including now)? No        Transportation Needs    Has the lack of transportation kept you from medical appointments, meetings, work or from getting things needed for daily living? No        Food Insecurity    Within the past 12 months, you worried that your food would run out before you got the money to buy more. Never true        Stress    Do you feel stress - tense, restless, nervous, or anxious, or unable to sleep at night because your mind is troubled all the time - these days? To some extent        Social Isolation    How often do you feel lonely or isolated from those around you?  Rarely        Alcohol Use    Q1: How often do you have a drink containing alcohol? Never     Q2: How many drinks containing alcohol do you have on a typical day when you are drinking? Patient does not drink     Q3: How often do you have six or more drinks on one occasion? Never        Utilities    In the past 12 months has the electric, gas, oil, or water company threatened to shut off services in your home? No        Health Literacy    How often do you need to have someone help you when you read instructions, pamphlets, or other written material from your doctor or pharmacy? Never        OTHER    Name(s) of  People in Home Odette Phillip (spouse) [phone]                   Lashay Leon RN     [phone]

## 2025-02-05 NOTE — PLAN OF CARE
Patient AAOx4, able to make needs known to staff. Denies pain/discomfort. JACK drains x3, serosanguineous drainage noted. Voids spontaneous green colored urine. Indep. with ADLS. No apparent distress noted. Plan of care in place, safety maintained. Call bell within reach.        Problem: Adult Inpatient Plan of Care  Goal: Plan of Care Review  Outcome: Progressing  Goal: Patient-Specific Goal (Individualized)  Outcome: Progressing  Goal: Absence of Hospital-Acquired Illness or Injury  Outcome: Progressing  Goal: Optimal Comfort and Wellbeing  Outcome: Progressing  Goal: Readiness for Transition of Care  Outcome: Progressing     Problem: Fall Injury Risk  Goal: Absence of Fall and Fall-Related Injury  Outcome: Progressing     Problem: Infection  Goal: Absence of Infection Signs and Symptoms  Outcome: Progressing

## 2025-02-05 NOTE — ASSESSMENT & PLAN NOTE
Creatine stable for now. BMP reviewed- noted Estimated Creatinine Clearance: 16.3 mL/min (A) (based on SCr of 5.7 mg/dL (H)). according to latest data. Based on current GFR, CKD stage is end stage.  Monitor UOP and serial BMP and adjust therapy as needed. Renally dose meds. Avoid nephrotoxic medications and procedures.    -Nephro for HD, plan to resume on 2/5/2025  -Will need access- has an IJ catheter in right now  -Will likely transfer back to OSH to continue treatment

## 2025-02-05 NOTE — ASSESSMENT & PLAN NOTE
59M with PMH of ADPKD with recurrent UTIs, now ESRD on HD, initially presented to [facility] with dysuria and flank pain, found to have emphysematous pyelonephritis s/p 3 drains placed, transferred to [facility] for urology services. Does have another fluid collection inferior to drain, no further intervention planned by urology. Ecoli from OSH susceptible to cefazolin, ceftriaxone, cefepime, zosyn; resistant to FQs. Planning to be transferred back to [facility].     Recommendations  Continue ceftriaxone for now  Will need at least 2 weeks of antibiotics, and follow up imaging towards the end of the course prior to discontinuing treatment and to assist with drain removal  Can be managed by ID provider closer to [place]

## 2025-02-05 NOTE — ASSESSMENT & PLAN NOTE
He reports taking amiodarone and xarelto at home prior to this admission. Additionally, he only reports taking the xarelto when he felt he was in afib, and was taking a half pill when he did. He also mentioned an ablation? He reports the amiodarone was stopped while he is in the hospital, but cannot provide more detail. His AC was also stopped due to hematuria while at OSH. On admission to [facility], he was in aifb but was rate controlled with a HR of 83.      -Will resume amiodarone  -Continue to hold AC for possible placement of trialysis line prior to transfer back to OSH

## 2025-02-05 NOTE — SUBJECTIVE & OBJECTIVE
Interval History: No acute events overnight. Patient refused guardado. Has good UOP and produced 1.85L overnight. No surgery indicated at this time. Patient interested in transferring back to OSH to continue his care.     Review of Systems  Objective:     Vital Signs (Most Recent):  Temp: 98.4 °F (36.9 °C) (02/05/25 0756)  Pulse: 78 (02/05/25 1052)  Resp: 20 (02/05/25 0756)  BP: (!) 143/73 (02/05/25 0756)  SpO2: 96 % (02/05/25 0756) Vital Signs (24h Range):  Temp:  [97.9 °F (36.6 °C)-99.1 °F (37.3 °C)] 98.4 °F (36.9 °C)  Pulse:  [64-78] 78  Resp:  [18-20] 20  SpO2:  [92 %-96 %] 96 %  BP: (124-155)/(68-81) 143/73     Weight: 109 kg (240 lb 4.8 oz)  Body mass index is 36.54 kg/m².    Intake/Output Summary (Last 24 hours) at 2/5/2025 1127  Last data filed at 2/5/2025 0613  Gross per 24 hour   Intake 640 ml   Output 3091 ml   Net -2451 ml         Physical Exam  Constitutional:       General: He is not in acute distress.     Appearance: He is not ill-appearing.   HENT:      Head: Normocephalic and atraumatic.      Mouth/Throat:      Mouth: Mucous membranes are moist.      Pharynx: Oropharynx is clear.   Eyes:      General: No scleral icterus.  Cardiovascular:      Rate and Rhythm: Normal rate. Rhythm irregular.   Pulmonary:      Effort: Pulmonary effort is normal. No respiratory distress.      Breath sounds: No rhonchi or rales. Wheezes: mild.  Abdominal:      General: There is distension (he reports this is his baseline).      Palpations: Abdomen is soft.      Tenderness: There is abdominal tenderness (right side where drains are). There is no guarding or rebound.      Comments: Multiple surgical drains exiting the left abdomen    Musculoskeletal:      Right lower leg: No edema.      Left lower leg: No edema.   Skin:     General: Skin is warm and dry.      Coloration: Skin is pale.   Neurological:      Mental Status: He is alert and oriented to person, place, and time.   Psychiatric:         Mood and Affect: Mood normal.          Behavior: Behavior normal.             Significant Labs: All pertinent labs within the past 24 hours have been reviewed.  BMP:   Recent Labs   Lab 02/05/25  0510   GLU 79   *   K 4.2   CL 98   CO2 20*   BUN 46*   CREATININE 5.7*   CALCIUM 7.5*   MG 1.9     CBC:   Recent Labs   Lab 02/04/25  1045 02/05/25  0510   WBC 16.24* 13.99*   HGB 8.5* 8.0*   HCT 28.3* 25.8*    257     CMP:   Recent Labs   Lab 02/04/25  1045 02/05/25  0510   * 127*   K 4.3 4.2   CL 98 98   CO2 21* 20*    79   BUN 45* 46*   CREATININE 5.2* 5.7*   CALCIUM 7.6* 7.5*   PROT 5.6* 5.5*   ALBUMIN 1.8* 1.7*   BILITOT 0.3 0.4   ALKPHOS 74 71   AST 25 24   ALT 23 21   ANIONGAP 9 9     Urine Studies:   Recent Labs   Lab 02/04/25  1046   COLORU Yellow   APPEARANCEUA Cloudy*   PHUR 7.0   SPECGRAV 1.010   PROTEINUA 2+*   GLUCUA Negative   KETONESU Negative   BILIRUBINUA Negative   OCCULTUA 3+*   NITRITE Negative   LEUKOCYTESUR 3+*   RBCUA 21*   WBCUA >100*   BACTERIA Moderate*   SQUAMEPITHEL 1   HYALINECASTS 0       Significant Imaging: I have reviewed all pertinent imaging results/findings within the past 24 hours.

## 2025-02-05 NOTE — PROGRESS NOTES
[facility] - Telemetry [facility]  Progress Note    Patient Name: Kenneth Phillip  MRN: [id number]  Patient Class: IP- Inpatient   Admission Date: 2/4/2025  Length of Stay: 1 days  Attending Physician: Sergio Bloom III*  Primary Care Provider: Марина, Primary Doctor        Subjective     Principal Problem:Emphysematous pyelonephritis        HPI:  FROM  NOTE:   59-year-old male with a history of ADPKD, episodes of MARIZA superimposed on CKD (baseline creatinine appears to be around 6-7), hypertension, atrial fibrillation (on Xarelto), and UTIs admitted to [facility] January 25 with hiccups, nausea with vomiting, dizziness, poor oral intake, dysuria, hematuria, and flank pain. Flank pain was mostly on the left. He had no fever chills, no chest pain or dyspnea, and no evidence of GI bleeding. Chest x-ray and CT head had no acute abnormalities. Labs were concerning for leukocytosis, anemia, hyponatremia, and worsened renal function. CT of the abdomen and pelvis noted emphysematous pyelonephritis on the left with multiple areas of gas scattered about the renal cortex and within renal cysts. There was also perinephric edema and emphysematous cystitis. Blood and urine cultures were collected, and he was admitted and placed on antibiotics. He received normal saline for the hyponatremia. Xarelto was held with concern for hematuria. He was seen by Nephrology who noted patient has end-stage renal disease and was advised to initiate dialysis previously but he was hesitant. He was initiated on bicarbonate infusion with his metabolic acidosis. They again discussed with the patient initiation of hemodialysis. He was seen by Urology who recommended treatment of UTI with no specific  intervention at that time. Overnight on January 26-27, he developed worsening hypotension with tachycardia and fever. He was transferred to the ICU. Hemoglobin on January 26 was noted to be low, and he received packed red blood  cells. He was noted to be lethargic in the ICU, and additional IV fluid was ordered. He required pressors. Course was complicated by metabolic encephalopathy, but mentation has improved. Hemodynamic status has improved, and patient transitioned out of the ICU to a [facility] bed. Patient agreed to initiation of hemodialysis, and he has been undergoing hemodialysis through a temporary IJ catheter. Blood and urine cultures from January 25 grew E coli that was sensitive to ceftriaxone. He remains on ceftriaxone, and repeat blood cultures from January 27 and January 31 have no growth to date. Repeat CT of the abdomen and pelvis on January 31 showed persistent emphysematous pyelonephritis. Referring team is requesting transfer to Hospital Medicine at [facility] for Urology evaluation for potential nephrectomy. Referring team spoke with Urology at [facility] and they are available for consultation. Uncertain at present if surgical intervention will be needed. Will plan transfer to Hospital Medicine at [facility] for further treatment.         59 yoM with pmh of ADPKD now on HD, HTN, afib (questionable xarelto hx), and UTIs who presents as a transfer for urological evaluation for emphysematous pyelonephritis after being treated at OSH for urosepsis. On arrival to [facility] he was resting comfortably in his bed. He reports that his only complaint at this time is abdominal soreness from his recent drain placement, otherwise he feels significantly improved since initially presenting. He explained that about a week ago he passed and that was when his family brought him in and the urosepsis, etc was found. E coli was isolated in both his urine and blood, but he has reportedly since cleared cultures. He currently denies any fever, chills, or SOB. He does report some cough with sputum that he apparently got a CXR for and it was clear. He has some intermittent N/V as well.    On arrival to [facility], he was HDS and AF. Most  recent labs available were from 1/31 and showed: White blood cells 15.6, hemoglobin 7.3, hematocrit 24.5, platelets 231, sodium 134, potassium 4.2, chloride 101, CO2 26, BUN 56, creatinine 4.93, glucose 226. Upon arrival, labs and repeat infectious workup was ordered. Nephrology was consulted for HD needs and urology for possible nephrectomy. His ceftriaxone was continued, although I do not know the start date.    Overview/Hospital Course:  Patient admitted as transfer from [facility] for urology evaluation and possible nephrectomy for emphysematous pyelonephritis. Vital signs stable since arrival. Evaluated by Urology and no surgical intervention indicated at this time. Outpatient follow-up recommended back in [facility]. Patient with ADPKD with progression to ESRD at OSH due to sepsis. Was initiated on hemodialysis using a temporary catheter. Nephrology following with plan to resume HD on 2/5/2025.    Interval History: No acute events overnight. Patient refused guardado. Has good UOP and produced 1.85L overnight. No surgery indicated at this time. Patient interested in transferring back to OSH to continue his care.     Review of Systems  Objective:     Vital Signs (Most Recent):  Temp: 98.4 °F (36.9 °C) (02/05/25 0756)  Pulse: 78 (02/05/25 1052)  Resp: 20 (02/05/25 0756)  BP: (!) 143/73 (02/05/25 0756)  SpO2: 96 % (02/05/25 0756) Vital Signs (24h Range):  Temp:  [97.9 °F (36.6 °C)-99.1 °F (37.3 °C)] 98.4 °F (36.9 °C)  Pulse:  [64-78] 78  Resp:  [18-20] 20  SpO2:  [92 %-96 %] 96 %  BP: (124-155)/(68-81) 143/73     Weight: 109 kg (240 lb 4.8 oz)  Body mass index is 36.54 kg/m².    Intake/Output Summary (Last 24 hours) at 2/5/2025 1127  Last data filed at 2/5/2025 0613  Gross per 24 hour   Intake 640 ml   Output 3091 ml   Net -2451 ml         Physical Exam  Constitutional:       General: He is not in acute distress.     Appearance: He is not ill-appearing.   HENT:      Head: Normocephalic and atraumatic.       Mouth/Throat:      Mouth: Mucous membranes are moist.      Pharynx: Oropharynx is clear.   Eyes:      General: No scleral icterus.  Cardiovascular:      Rate and Rhythm: Normal rate. Rhythm irregular.   Pulmonary:      Effort: Pulmonary effort is normal. No respiratory distress.      Breath sounds: No rhonchi or rales. Wheezes: mild.  Abdominal:      General: There is distension (he reports this is his baseline).      Palpations: Abdomen is soft.      Tenderness: There is abdominal tenderness (right side where drains are). There is no guarding or rebound.      Comments: Multiple surgical drains exiting the left abdomen    Musculoskeletal:      Right lower leg: No edema.      Left lower leg: No edema.   Skin:     General: Skin is warm and dry.      Coloration: Skin is pale.   Neurological:      Mental Status: He is alert and oriented to person, place, and time.   Psychiatric:         Mood and Affect: Mood normal.         Behavior: Behavior normal.             Significant Labs: All pertinent labs within the past 24 hours have been reviewed.  BMP:   Recent Labs   Lab 02/05/25  0510   GLU 79   *   K 4.2   CL 98   CO2 20*   BUN 46*   CREATININE 5.7*   CALCIUM 7.5*   MG 1.9     CBC:   Recent Labs   Lab 02/04/25  1045 02/05/25  0510   WBC 16.24* 13.99*   HGB 8.5* 8.0*   HCT 28.3* 25.8*    257     CMP:   Recent Labs   Lab 02/04/25  1045 02/05/25  0510   * 127*   K 4.3 4.2   CL 98 98   CO2 21* 20*    79   BUN 45* 46*   CREATININE 5.2* 5.7*   CALCIUM 7.6* 7.5*   PROT 5.6* 5.5*   ALBUMIN 1.8* 1.7*   BILITOT 0.3 0.4   ALKPHOS 74 71   AST 25 24   ALT 23 21   ANIONGAP 9 9     Urine Studies:   Recent Labs   Lab 02/04/25  1046   COLORU Yellow   APPEARANCEUA Cloudy*   PHUR 7.0   SPECGRAV 1.010   PROTEINUA 2+*   GLUCUA Negative   KETONESU Negative   BILIRUBINUA Negative   OCCULTUA 3+*   NITRITE Negative   LEUKOCYTESUR 3+*   RBCUA 21*   WBCUA >100*   BACTERIA Moderate*   SQUAMEPITHEL 1   HYALINECASTS 0        Significant Imaging: I have reviewed all pertinent imaging results/findings within the past 24 hours.    Assessment and Plan     * Emphysematous pyelonephritis  59 yoM with pmh of ADPKD now on HD, HTN, afib (questionable xarelto hx), and UTIs who presents as a transfer for urological evaluation for emphysematous pyelonephritis after being treated at OSH for urosepsis. There he was found to have a UTI and bacteremia secondary to E. Coli. CT A/P showed: Patient has developed emphysematous pyelonephritis on the left since prior study with multiple areas of gas scattered about the renal cortex and within renal cysts. Perinephric edema has also developed. 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 Multiple cysts in the liver also noted. He required MICU for vasopressors and was started on HD as well. Infection improved with abx and surgical drainage. He has apparently cleared cultures and the E coli is sensitive to ceftriaxone. Clinically, he appears much improved since presentation. On arrival to [facility] he is HDS and AF.     UA: cloudy with 3+ leukocytes, >100 wbc, and moderate bacteria  2/4 blood clx with NGTD    PLAN:  -Continue ceftriaxone; will need to determine EOT  -Follow up repeat infectious workup  -Urology consulted for possible nephrectomy, no surgical intervention indicated   - Follow-up with Urology in [place] (where patient is from)  -Follow-up urine cultures           Cough  Reports a productive cough developing the last several days. Spo2 96% on arrival today. Says he had a CXR at OSH that was without PNA, but this was on 1/25.    -F/u repeat CXR  -Monitor respiratory status.      Former smoker  Reports smoking from age [age]. Some wheezing heard on exam but no SOB.    -Consider outpatient PFTs  -Albuterol and duonebs prn      Obesity (BMI 30-39.9)  There is no height or  weight on file to calculate BMI. Morbid obesity complicates all aspects of disease management from diagnostic modalities to treatment. Weight loss encouraged and health benefits explained to patient.         ESRD (end stage renal disease)  Creatine stable for now. BMP reviewed- noted Estimated Creatinine Clearance: 16.3 mL/min (A) (based on SCr of 5.7 mg/dL (H)). according to latest data. Based on current GFR, CKD stage is end stage.  Monitor UOP and serial BMP and adjust therapy as needed. Renally dose meds. Avoid nephrotoxic medications and procedures.    -Nephro for HD, plan to resume on 2/5/2025  -Will need access- has an IJ catheter in right now  -Will likely transfer back to OSH to continue treatment     Hypertension  Patient's blood pressure range in the last 24 hours was: BP  Min: 124/70  Max: 155/81.The patient's inpatient anti-hypertensive regimen is listed below:  Current Antihypertensives  metoprolol succinate (TOPROL-XL) 24 hr tablet 25 mg, Every evening, Oral  , Daily, OralToprol 25  Amlodipine 5      BP Readings from Last 1 Encounters:   02/05/25 (!) 143/73         Plan  - Restarted Toprol 25 mg daily  - Will hold amlodipine until additional BP control required     Atrial fibrillation  He reports taking amiodarone and xarelto at home prior to this admission. Additionally, he only reports taking the xarelto when he felt he was in afib, and was taking a half pill when he did. He also mentioned an ablation? He reports the amiodarone was stopped while he is in the hospital, but cannot provide more detail. His AC was also stopped due to hematuria while at OSH. [place] admission [place] [facility] he was in aifb but was rate controlled with a HR of 83.      -Will resume amiodarone  -Continue to hold AC for possible placement of trialysis line prior to transfer back to OSH      Autosomal dominant polycystic kidney disease  Patient has had longstanding history of kidney disease. It appears HD had been on the table but this  recent infection set him over the edge and he became ESRD. He is still hoping for transplant eventually.         VTE Risk Mitigation (From admission, onward)           Ordered     IP VTE HIGH RISK PATIENT  Once         02/04/25 0431     Place sequential compression device  Until discontinued         02/04/25 0431     Reason for No Pharmacological VTE Prophylaxis  Once        Question:  Reasons:  Answer:  Physician Provided (leave comment)  Comment:  possible surgery    02/04/25 0431                    Discharge Planning   BECCA:      Code Status: Full Code   Medical Readiness for Discharge Date:                            Estela Vanessa DO  Department of Hospital Medicine   [facility] - Telemetry Stepdown

## 2025-02-05 NOTE — SUBJECTIVE & OBJECTIVE
Interval History: NAEO, volume status similar to yesterday and renal function stable. Planned for HD today.    Review of patient's allergies indicates:   Allergen Reactions    Penicillins Other (See Comments)     Current Facility-Administered Medications   Medication Frequency    acetaminophen tablet 650 mg Q4H PRN    albuterol inhaler 2 puff Q6H PRN    albuterol-ipratropium 2.5 mg-0.5 mg/3 mL nebulizer solution 3 mL Q6H PRN    amiodarone tablet 200 mg Daily    cefTRIAXone injection 2 g Q24H    dextrose 50% injection 12.5 g PRN    dextrose 50% injection 25 g PRN    glucagon (human recombinant) injection 1 mg PRN    glucose chewable tablet 16 g PRN    glucose chewable tablet 24 g PRN    melatonin tablet 6 mg Nightly PRN    metoprolol succinate (TOPROL-XL) 24 hr tablet 25 mg Daily PM    naloxone 0.4 mg/mL injection 0.02 mg PRN    ondansetron injection 4 mg Q8H PRN    oxyCODONE immediate release tablet 5 mg Q4H PRN    sodium chloride 0.9% flush 10 mL Q12H PRN       Objective:     Vital Signs (Most Recent):  Temp: 96.9 °F (36.1 °C) (02/05/25 1138)  Pulse: 73 (02/05/25 1138)  Resp: (!) 22 (02/05/25 1138)  BP: (!) 123/57 (02/05/25 1138)  SpO2: 99 % (02/05/25 1138) Vital Signs (24h Range):  Temp:  [96.9 °F (36.1 °C)-99.1 °F (37.3 °C)] 96.9 °F (36.1 °C)  Pulse:  [64-78] 73  Resp:  [18-22] 22  SpO2:  [92 %-99 %] 99 %  BP: (123-155)/(57-81) 123/57     Weight: 109 kg (240 lb 4.8 oz) (02/05/25 0013)  Body mass index is 36.54 kg/m².  Body surface area is 2.29 meters squared.    I/O last 3 completed shifts:  In: 880 [P.O.:880]  Out: 4041 [Urine:3825; Drains:215; Stool:1]     Physical Exam  HENT:      Head: Normocephalic and atraumatic.   Eyes:      Extraocular Movements: Extraocular movements intact.      Pupils: Pupils are equal, round, and reactive to light.   Cardiovascular:      Rate and Rhythm: Normal rate and regular rhythm.      Pulses: Normal pulses.      Heart sounds: Normal heart sounds.   Pulmonary:      Effort:  Pulmonary effort is normal.      Breath sounds: Normal breath sounds.   Abdominal:      General: Bowel sounds are normal.   Musculoskeletal:      Right lower leg: Edema present.      Left lower leg: Edema present.   Neurological:      Mental Status: He is alert.          Significant Labs:  CBC:   Recent Labs   Lab 02/05/25  0510   WBC 13.99*   RBC 2.77*   HGB 8.0*   HCT 25.8*      MCV 93   MCH 28.9   MCHC 31.0*     CMP:   Recent Labs   Lab 02/05/25  0510   GLU 79   CALCIUM 7.5*   ALBUMIN 1.7*   PROT 5.5*   *   K 4.2   CO2 20*   CL 98   BUN 46*   CREATININE 5.7*   ALKPHOS 71   ALT 21   AST 24   BILITOT 0.4     All labs within the past 24 hours have been reviewed.     Significant Imaging:

## 2025-02-05 NOTE — ASSESSMENT & PLAN NOTE
59 yoM with pmh of ADPKD now on HD, HTN, afib (questionable xarelto hx), and UTIs who presents as a transfer for urological evaluation for emphysematous pyelonephritis after being treated at OSH for urosepsis. There he was found to have a UTI and bacteremia secondary to E. Coli. CT A/P showed: Patient has developed emphysematous pyelonephritis on the left since prior study with multiple areas of gas scattered about the renal cortex and within renal cysts. Perinephric edema has also developed. 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 Multiple cysts in the liver also noted. He required MICU for vasopressors and was started on HD as well. Infection improved with abx and surgical drainage. He has apparently cleared cultures and the E coli is sensitive to ceftriaxone. Clinically, he appears much improved since presentation. [place] [facility] [place] is HDS and AF.     UA: cloudy with 3+ leukocytes, >100 wbc, and moderate bacteria  2/4 blood clx with NGTD    PLAN:  -Continue ceftriaxone; will need to determine EOT  -Follow up repeat infectious workup  -Urology consulted for possible nephrectomy, no surgical intervention indicated   - Follow-up with Urology in [place] (where patient is from)  -Follow-up urine cultures

## 2025-02-06 VITALS
RESPIRATION RATE: 18 BRPM | BODY MASS INDEX: 36.42 KG/M2 | HEIGHT: 68 IN | DIASTOLIC BLOOD PRESSURE: 70 MMHG | HEART RATE: 78 BPM | OXYGEN SATURATION: 96 % | WEIGHT: 240.31 LBS | TEMPERATURE: 98 F | SYSTOLIC BLOOD PRESSURE: 130 MMHG

## 2025-02-06 LAB
ALBUMIN SERPL BCP-MCNC: 1.7 G/DL (ref 3.5–5.2)
ALP SERPL-CCNC: 74 U/L (ref 40–150)
ALT SERPL W/O P-5'-P-CCNC: 30 U/L (ref 10–44)
ANION GAP SERPL CALC-SCNC: 12 MMOL/L (ref 8–16)
AST SERPL-CCNC: 41 U/L (ref 10–40)
BASOPHILS # BLD AUTO: 0.02 K/UL (ref 0–0.2)
BASOPHILS NFR BLD: 0.2 % (ref 0–1.9)
BILIRUB SERPL-MCNC: 0.3 MG/DL (ref 0.1–1)
BUN SERPL-MCNC: 31 MG/DL (ref 8–23)
CALCIUM SERPL-MCNC: 7.5 MG/DL (ref 8.7–10.5)
CHLORIDE SERPL-SCNC: 100 MMOL/L (ref 95–110)
CO2 SERPL-SCNC: 21 MMOL/L (ref 23–29)
CREAT SERPL-MCNC: 4 MG/DL (ref 0.5–1.4)
DIFFERENTIAL METHOD BLD: ABNORMAL
EOSINOPHIL # BLD AUTO: 0 K/UL (ref 0–0.5)
EOSINOPHIL NFR BLD: 0.4 % (ref 0–8)
ERYTHROCYTE [DISTWIDTH] IN BLOOD BY AUTOMATED COUNT: 17.2 % (ref 11.5–14.5)
EST. GFR  (NO RACE VARIABLE): 16.2 ML/MIN/1.73 M^2
GLUCOSE SERPL-MCNC: 82 MG/DL (ref 70–110)
HCT VFR BLD AUTO: 24 % (ref 40–54)
HGB BLD-MCNC: 7.4 G/DL (ref 14–18)
IMM GRANULOCYTES # BLD AUTO: 0.07 K/UL (ref 0–0.04)
IMM GRANULOCYTES NFR BLD AUTO: 0.6 % (ref 0–0.5)
LYMPHOCYTES # BLD AUTO: 0.6 K/UL (ref 1–4.8)
LYMPHOCYTES NFR BLD: 4.9 % (ref 18–48)
MAGNESIUM SERPL-MCNC: 1.7 MG/DL (ref 1.6–2.6)
MCH RBC QN AUTO: 28.4 PG (ref 27–31)
MCHC RBC AUTO-ENTMCNC: 30.8 G/DL (ref 32–36)
MCV RBC AUTO: 92 FL (ref 82–98)
MONOCYTES # BLD AUTO: 1 K/UL (ref 0.3–1)
MONOCYTES NFR BLD: 8.9 % (ref 4–15)
NEUTROPHILS # BLD AUTO: 9.5 K/UL (ref 1.8–7.7)
NEUTROPHILS NFR BLD: 85 % (ref 38–73)
NRBC BLD-RTO: 0 /100 WBC
PHOSPHATE SERPL-MCNC: 4.1 MG/DL (ref 2.7–4.5)
PLATELET # BLD AUTO: 234 K/UL (ref 150–450)
PMV BLD AUTO: 10.1 FL (ref 9.2–12.9)
POTASSIUM SERPL-SCNC: 3.8 MMOL/L (ref 3.5–5.1)
PROT SERPL-MCNC: 5.4 G/DL (ref 6–8.4)
RBC # BLD AUTO: 2.61 M/UL (ref 4.6–6.2)
SODIUM SERPL-SCNC: 133 MMOL/L (ref 136–145)
WBC # BLD AUTO: 11.17 K/UL (ref 3.9–12.7)

## 2025-02-06 PROCEDURE — 25000003 PHARM REV CODE 250: Mod: NTX | Performed by: FAMILY MEDICINE

## 2025-02-06 PROCEDURE — 36415 COLL VENOUS BLD VENIPUNCTURE: CPT | Mod: NTX

## 2025-02-06 PROCEDURE — 80053 COMPREHEN METABOLIC PANEL: CPT | Mod: NTX

## 2025-02-06 PROCEDURE — 83735 ASSAY OF MAGNESIUM: CPT | Mod: NTX

## 2025-02-06 PROCEDURE — 84100 ASSAY OF PHOSPHORUS: CPT | Mod: NTX

## 2025-02-06 PROCEDURE — 25000003 PHARM REV CODE 250: Mod: NTX

## 2025-02-06 PROCEDURE — 63600175 PHARM REV CODE 636 W HCPCS: Mod: NTX

## 2025-02-06 PROCEDURE — 85025 COMPLETE CBC W/AUTO DIFF WBC: CPT | Mod: NTX

## 2025-02-06 RX ORDER — MUPIROCIN 20 MG/G
OINTMENT TOPICAL 2 TIMES DAILY
Status: CANCELLED | OUTPATIENT
Start: 2025-02-07 | End: 2025-02-12

## 2025-02-06 RX ORDER — SODIUM CHLORIDE 9 MG/ML
INJECTION, SOLUTION INTRAVENOUS
Status: CANCELLED | OUTPATIENT
Start: 2025-02-07

## 2025-02-06 RX ORDER — SODIUM CHLORIDE 9 MG/ML
INJECTION, SOLUTION INTRAVENOUS ONCE
Status: CANCELLED | OUTPATIENT
Start: 2025-02-07

## 2025-02-06 RX ADMIN — SEVELAMER CARBONATE 800 MG: 800 TABLET, FILM COATED ORAL at 05:02

## 2025-02-06 RX ADMIN — SEVELAMER CARBONATE 800 MG: 800 TABLET, FILM COATED ORAL at 08:02

## 2025-02-06 RX ADMIN — CEFTRIAXONE 2 G: 2 INJECTION, POWDER, FOR SOLUTION INTRAMUSCULAR; INTRAVENOUS at 04:02

## 2025-02-06 RX ADMIN — SEVELAMER CARBONATE 800 MG: 800 TABLET, FILM COATED ORAL at 01:02

## 2025-02-06 RX ADMIN — METOPROLOL SUCCINATE 25 MG: 25 TABLET, EXTENDED RELEASE ORAL at 05:02

## 2025-02-06 RX ADMIN — AMIODARONE HYDROCHLORIDE 200 MG: 200 TABLET ORAL at 08:02

## 2025-02-06 RX ADMIN — ONDANSETRON 4 MG: 2 INJECTION INTRAMUSCULAR; INTRAVENOUS at 04:02

## 2025-02-06 NOTE — ASSESSMENT & PLAN NOTE
Patient's blood pressure range in the last 24 hours was: BP  Min: 112/61  Max: 144/75.The patient's inpatient anti-hypertensive regimen is listed below:  Current Antihypertensives  metoprolol succinate (TOPROL-XL) 24 hr tablet 25 mg, Every evening, Oral  , Daily, OralToprol 25  Amlodipine 5      BP Readings from Last 1 Encounters:   02/06/25 131/73         Plan  - Restarted Toprol 25 mg daily  - Will hold amlodipine until additional BP control required

## 2025-02-06 NOTE — PROGRESS NOTES
[facility] - Telemetry Stepdown  Nephrology  Progress Note    Patient Name: Kenneth Phillip  MRN: [id number]  Admission Date: 2/4/2025  Hospital Length of Stay: 2 days  Attending Provider: Otoniel Barrios DO   Primary Care Physician: Марина, Primary Doctor  Principal Problem:Emphysematous pyelonephritis    Subjective:     HPI: 59 yoM with pmh of ADPKD now on HD, HTN, afib (questionable xarelto hx), and UTIs who presents as a transfer for urological evaluation for emphysematous pyelonephritis after being treated at OSH for urosepsis. On arrival to [facility] he was resting comfortably in his bed. He reports that his only complaint at this time is abdominal soreness from his recent drain placement, otherwise he feels significantly improved since initially presenting. He explained that about a week ago he passed out and that was when his family brought him in and the urosepsis, etc was found. E coli was isolated in both his urine and blood, but he has reportedly since cleared cultures. He currently denies any fever, chills, or SOB. He does report some cough with sputum that he apparently got a CXR for and it was clear. He has some intermittent N/V as well.     On arrival to [facility], he was HDS and AF. Most recent labs available were from 1/31 and showed: White blood cells 15.6, hemoglobin 7.3, hematocrit 24.5, platelets 231, sodium 134, potassium 4.2, chloride 101, CO2 26, BUN 56, creatinine 4.93, glucose 226. Upon arrival, labs and repeat infectious workup was ordered. Nephrology was consulted for HD needs and urology for possible nephrectomy. His ceftriaxone was continued, although I do not know the start date.    Interval History: Pt received HD yesterday, NAEO. Planning to transfer back to [place] when accepted.    Review of patient's allergies indicates:   Allergen Reactions    Penicillins Other (See Comments)     Current Facility-Administered Medications   Medication Frequency    acetaminophen tablet 650 mg Q4H PRN    albuterol  inhaler 2 puff Q6H PRN    albuterol-ipratropium 2.5 mg-0.5 mg/3 mL nebulizer solution 3 mL Q6H PRN    amiodarone tablet 200 mg Daily    cefTRIAXone injection 2 g Q24H    dextrose 50% injection 12.5 g PRN    dextrose 50% injection 25 g PRN    glucagon (human recombinant) injection 1 mg PRN    glucose chewable tablet 16 g PRN    glucose chewable tablet 24 g PRN    melatonin tablet 6 mg Nightly PRN    metoprolol succinate (TOPROL-XL) 24 hr tablet 25 mg Daily PM    naloxone 0.4 mg/mL injection 0.02 mg PRN    ondansetron injection 4 mg Q8H PRN    oxyCODONE immediate release tablet 5 mg Q4H PRN    sevelamer carbonate tablet 800 mg TID WM    sodium chloride 0.9% flush 10 mL Q12H PRN       Objective:     Vital Signs (Most Recent):  Temp: 98.7 °F (37.1 °C) (02/06/25 1134)  Pulse: 75 (02/06/25 1134)  Resp: 20 (02/06/25 1134)  BP: 131/73 (02/06/25 1134)  SpO2: 97 % (02/06/25 1134) Vital Signs (24h Range):  Temp:  [98.5 °F (36.9 °C)-99.6 °F (37.6 °C)] 98.7 °F (37.1 °C)  Pulse:  [70-82] 75  Resp:  [17-25] 20  SpO2:  [90 %-98 %] 97 %  BP: (112-144)/(59-75) 131/73     Weight: 109 kg (240 lb 4.8 oz) (02/05/25 0013)  Body mass index is 36.54 kg/m².  Body surface area is 2.29 meters squared.    I/O last 3 completed shifts:  In: 2100 [P.O.:1900; Other:200]  Out: 5296 [Urine:2930; Drains:265; Other:2100; Stool:1]     Physical Exam  HENT:      Head: Normocephalic and atraumatic.   Eyes:      Extraocular Movements: Extraocular movements intact.      Pupils: Pupils are equal, round, and reactive to light.   Cardiovascular:      Rate and Rhythm: Normal rate and regular rhythm.      Pulses: Normal pulses.      Heart sounds: Normal heart sounds.   Pulmonary:      Effort: Pulmonary effort is normal.      Breath sounds: Normal breath sounds.   Abdominal:      General: Bowel sounds are normal.   Musculoskeletal:      Right lower leg: Edema present.      Left lower leg: Edema present.   Neurological:      Mental Status: He is alert.           Significant Labs:  CBC:   Recent Labs   Lab 02/06/25  0446   WBC 11.17   RBC 2.61*   HGB 7.4*   HCT 24.0*      MCV 92   MCH 28.4   MCHC 30.8*     CMP:   Recent Labs   Lab 02/06/25  0446   GLU 82   CALCIUM 7.5*   ALBUMIN 1.7*   PROT 5.4*   *   K 3.8   CO2 21*      BUN 31*   CREATININE 4.0*   ALKPHOS 74   ALT 30   AST 41*   BILITOT 0.3     All labs within the past 24 hours have been reviewed.     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 Pt was admitted to OSH on 1/25, found to have MARIZA on CKD as well as emphysematous pyelonephritis. Renal function worsened with Cr increasing to 12 per pt and he developed uremia. Temporary HD line placed in Right IJ and HD initiated. Pt now transferred to [facility] for Urology consult to assess need for further intervention. Nephrology has been consulted to manage inpatient HD. Pt has increased LE edema on initial eval, but denies any SOB, and shows no signs of uremia. Last HD session 2 days ago per pt.    Plan:  -Renal function stable, continue to follow daily labs  -received HD yesterday 2/5 now pending transfer back to [place] for Tunneled Cath placement, will repeat HD tomorrow 2/7 if pt has not transferred at that time          Thank you for your consult. I will follow-up with patient. Please contact us if you have any additional questions.    Latrell Ayoub MD  Nephrology  [facility] - Telemetry Stepdown

## 2025-02-06 NOTE — PROGRESS NOTES
[facility] - Telemetry [facility]  Progress Note    Patient Name: Kenneth Phillip  MRN: [id number]  Patient Class: IP- Inpatient   Admission Date: 2/4/2025  Length of Stay: 2 days  Attending Physician: Otoniel Barrios DO  Primary Care Provider: Марина, Primary Doctor        Subjective     Principal Problem:Emphysematous pyelonephritis        HPI:  59-year-old male with a history of ADPKD, episodes of MARIZA superimposed on CKD (baseline creatinine appears to be around 6-7), hypertension, atrial fibrillation (on Xarelto), and UTIs admitted to [facility] 25 with hiccups, nausea with vomiting, dizziness, poor oral intake, dysuria, hematuria, and flank pain. Flank pain was mostly on the left. He had no fever chills, no chest pain or dyspnea, and no evidence of GI bleeding. Chest x-ray and CT head had no acute abnormalities. Labs were concerning for leukocytosis, anemia, hyponatremia, and worsened renal function. CT of the abdomen and pelvis noted 59 yoM with pmh of ADPKD now on HD, HTN, afib (questionable xarelto hx), and UTIs who presents as a transfer for urological evaluation for emphysematous pyelonephritis after being treated at OSH for urosepsis. [place] [facility] [place] was resting comfortably in his bed. He reports that his only complaint at this time is abdominal soreness from his recent drain placement, otherwise he feels significantly improved since initially presenting. He explained that about a week ago he passed and that was when his family brought him in and the urosepsis, etc was found. E coli was isolated in both his urine and blood, but he has reportedly since cleared cultures. He currently denies any fever, chills, or SOB. He does report some cough with sputum that he apparently got a CXR for and it was clear. He has some intermittent N/V as well.    On arrival to [facility], he was HDS and AF. Most recent labs available were from 1/31 and showed: White blood cells 15.6, hemoglobin 7.3,  hematocrit 24.5, platelets 231, sodium 134, potassium 4.2, chloride 101, CO2 26, BUN 56, creatinine 4.93, glucose 226. Upon arrival, labs and repeat infectious workup was ordered. Nephrology was consulted for HD needs and urology for possible nephrectomy. His ceftriaxone was continued, although I do not know the start date.    Overview/Hospital Course:  Patient admitted as transfer from [facility] for urology evaluation and possible nephrectomy for emphysematous pyelonephritis. Vital signs stable since arrival. CTAP demonstrating nonspecific 6 cm fluid collection in the posterolateral perirenal space, inferior to the aforementioned pigtail catheters. Evaluated by Urology and no surgical intervention indicated at this time. Outpatient follow-up recommended back in [facility]. Patient with ADPKD with progression to ESRD at OSH due to sepsis. Was initiated on hemodialysis using a temporary catheter. Patient wanting to transfer back to OSH to continue his care, as this is closer to his home and he will be following up with the doctors there. Transfer orders placed. HD initiated by Nephrology on 2/5/2025. Plan to remove temporary trialysis and exchange for tunnel cath to continue dialysis when he returns to [facility].    Interval History: No acute events overnight.  cc's with daily net -1.65L. Reports some dysuria at end of stream and soreness around catheter site on left flank. Got HDS yesterday and tolerated well. Hgb 7.4. Renal function stable. Electrolytes somewhat improved. Pending transfer back to [facility] in [place].     Review of Systems  Objective:     Vital Signs (Most Recent):  Temp: 98.7 °F (37.1 °C) (02/06/25 1134)  Pulse: 75 (02/06/25 1134)  Resp: 20 (02/06/25 1134)  BP: 131/73 (02/06/25 1134)  SpO2: 97 % (02/06/25 1134) Vital Signs (24h Range):  Temp:  [98.5 °F (36.9 °C)-99.6 °F (37.6 °C)] 98.7 °F (37.1 °C)  Pulse:  [70-82] 75  Resp:  [17-25] 20  SpO2:  [90 %-98 %] 97  %  BP: (112-144)/(59-75) 131/73     Weight: 109 kg (240 lb 4.8 oz)  Body mass index is 36.54 kg/m².    Intake/Output Summary (Last 24 hours) at 2/6/2025 1326  Last data filed at 2/6/2025 0844  Gross per 24 hour   Intake 1460 ml   Output 3145 ml   Net -1685 ml         Physical Exam  Constitutional:       General: He is not in acute distress.     Appearance: He is not ill-appearing.   HENT:      Head: Normocephalic and atraumatic.      Mouth/Throat:      Mouth: Mucous membranes are moist.      Pharynx: Oropharynx is clear.   Eyes:      General: No scleral icterus.  Cardiovascular:      Rate and Rhythm: Normal rate and regular rhythm.   Pulmonary:      Effort: Pulmonary effort is normal. No respiratory distress.      Breath sounds: Wheezing (mild) present. No rhonchi or rales.   Abdominal:      General: There is distension (he reports this is his baseline).      Palpations: Abdomen is soft.      Tenderness: There is abdominal tenderness (right side where drains are). There is no guarding or rebound.      Comments: Multiple surgical drains exiting the left abdomen    Musculoskeletal:      Right lower leg: No edema.      Left lower leg: No edema.   Skin:     General: Skin is warm and dry.      Coloration: Skin is pale.   Neurological:      Mental Status: He is alert and oriented to person, place, and time.   Psychiatric:         Mood and Affect: Mood normal.         Behavior: Behavior normal.             Significant Labs: All pertinent labs within the past 24 hours have been reviewed.    BMP:   Recent Labs   Lab 02/06/25  0446   GLU 82   *   K 3.8      CO2 21*   BUN 31*   CREATININE 4.0*   CALCIUM 7.5*   MG 1.7     CBC:   Recent Labs   Lab 02/05/25  0510 02/06/25  0446   WBC 13.99* 11.17   HGB 8.0* 7.4*   HCT 25.8* 24.0*    234     CMP:   Recent Labs   Lab 02/05/25  0510 02/06/25  0446   * 133*   K 4.2 3.8   CL 98 100   CO2 20* 21*   GLU 79 82   BUN 46* 31*   CREATININE 5.7* 4.0*   CALCIUM 7.5*  7.5*   PROT 5.5* 5.4*   ALBUMIN 1.7* 1.7*   BILITOT 0.4 0.3   ALKPHOS 71 74   AST 24 41*   ALT 21 30   ANIONGAP 9 12       Significant Imaging: I have reviewed all pertinent imaging results/findings within the past 24 hours.    Assessment and Plan     * Emphysematous pyelonephritis  59 yoM with pmh of ADPKD now on HD, HTN, afib (questionable xarelto hx), and UTIs who presents as a transfer for urological evaluation for emphysematous pyelonephritis after being treated at OSH for urosepsis. There he was found to have a UTI and bacteremia secondary to E. Coli. CT A/P showed: Patient has developed emphysematous pyelonephritis on the left since prior study with multiple areas of gas scattered about the renal cortex and within renal cysts. Perinephric edema has also developed. 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 Multiple cysts in the liver also noted. He required MICU for vasopressors and was started on HD as well. Infection improved with abx and surgical drainage. He has apparently cleared cultures and the E coli is sensitive to ceftriaxone. Clinically, he appears much improved since presentation. On arrival to [facility] he is HDS and AF.     UA: cloudy with 3+ leukocytes, >100 wbc, and moderate bacteria; urine clx negative  2/4 blood clx with NGTD    PLAN:  -Continue ceftriaxone; ID consulted, appreciate recs for duration of abx treatment   - Plan to treat with IV ceftriaxone for 2 weeks with outpatient ID follow-up towards end of course  -Repeat infectious work-up done here negative  -Urology consulted for possible nephrectomy, no surgical intervention indicated   - Follow-up with Urology in [place] (where patient is from)  - Transfer back to OSH initiated           Cough  Reports a productive cough developing the last several days. Spo2 96% on arrival today. Says he had a CXR at OSH that  was without PNA, but this was on 1/25.    -F/u repeat CXR  -Monitor respiratory status.      Former smoker  Reports smoking from age [age]. Some wheezing heard on exam but no SOB.    -Consider outpatient PFTs  -Albuterol and duonebs prn      Obesity (BMI 30-39.9)  There is no height or weight on file to calculate BMI. Morbid obesity complicates all aspects of disease management from diagnostic modalities to treatment. Weight loss encouraged and health benefits explained to patient.         ESRD (end stage renal disease)  Creatine stable for now. BMP reviewed- noted Estimated Creatinine Clearance: 23.2 mL/min (A) (based on SCr of 4 mg/dL (H)). according to latest data. Based on current GFR, CKD stage is end stage.  Monitor UOP and serial BMP and adjust therapy as needed. Renally dose meds. Avoid nephrotoxic medications and procedures.    -Nephro for HD, resumed on 2/5/2025   - Tunnel cath to continue dialysis to be placed once transferred back to OSH  -Will need access- has an IJ catheter in right now    Hypertension  Patient's blood pressure range in the last 24 hours was: BP  Min: 112/61  Max: 144/75.The patient's inpatient anti-hypertensive regimen is listed below:  Current Antihypertensives  metoprolol succinate (TOPROL-XL) 24 hr tablet 25 mg, Every evening, Oral  , Daily, OralToprol 25  Amlodipine 5      BP Readings from Last 1 Encounters:   02/06/25 131/73         Plan  - Restarted Toprol 25 mg daily  - Will hold amlodipine until additional BP control required     Atrial fibrillation  He reports taking amiodarone and xarelto at home prior to this admission. Additionally, he only reports taking the xarelto when he felt he was in afib, and was taking a half pill when he did. He also mentioned an ablation? He reports the amiodarone was stopped while he is in the hospital, but cannot provide more detail. His AC was also stopped due to hematuria while at OSH. On admission to [facility], he was in aifb but was rate  controlled with a HR of 83.      -Will resume amiodarone, rhythm now controlled  -Continue to hold AC for possible placement of tunneled cath once transferred back to OSH      Autosomal dominant polycystic kidney disease  Patient has had longstanding history of kidney disease. It appears HD had been on the table but this recent infection set him over the edge and he became ESRD. He is still hoping for transplant eventually.         VTE Risk Mitigation (From admission, onward)           Ordered     IP VTE HIGH RISK PATIENT  Once         02/04/25 0431     Place sequential compression device  Until discontinued         02/04/25 0431     Reason for No Pharmacological VTE Prophylaxis  Once        Question:  Reasons:  Answer:  Physician Provided (leave comment)  Comment:  possible surgery    02/04/25 0431                    Discharge Planning   BECCA: 2/7/2025     Code Status: Full Code   Medical Readiness for Discharge Date:   Discharge Plan A: Home with family, Home Health                        Estela Vanessa DO  Department of Hospital Medicine   [facility] - Telemetry Stepdown

## 2025-02-06 NOTE — PLAN OF CARE
Per transfer center, patient has been accepted back to [facility]. Just waiting for a bed.      Lashay Leon RN     [phone]

## 2025-02-06 NOTE — ASSESSMENT & PLAN NOTE
59 yoM with pmh of ADPKD now on HD, HTN, afib (questionable xarelto hx), and UTIs who presents as a transfer for urological evaluation for emphysematous pyelonephritis after being treated at OSH for urosepsis. There he was found to have a UTI and bacteremia secondary to E. Coli. CT A/P showed: Patient has developed emphysematous pyelonephritis on the left since prior study with multiple areas of gas scattered about the renal cortex and within renal cysts. Perinephric edema has also developed. 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 Multiple cysts in the liver also noted. He required MICU for vasopressors and was started on HD as well. Infection improved with abx and surgical drainage. He has apparently cleared cultures and the E coli is sensitive to ceftriaxone. Clinically, he appears much improved since presentation. On arrival to [facility] he is HDS and AF.     UA: cloudy with 3+ leukocytes, >100 wbc, and moderate bacteria; urine clx negative  2/4 blood clx with NGTD    PLAN:  -Continue ceftriaxone; ID consulted, appreciate recs for duration of abx treatment   - Plan to treat with IV ceftriaxone for 2 weeks with outpatient ID follow-up towards end of course  -Repeat infectious work-up done here negative  -Urology consulted for possible nephrectomy, no surgical intervention indicated   - Follow-up with Urology in [place] (where patient is from)  - Transfer back to OSH initiated

## 2025-02-06 NOTE — NURSING
Spoke with the nurse Anabelle @[facility] report given. Awaiting transportation

## 2025-02-06 NOTE — ASSESSMENT & PLAN NOTE
59 yoM with pmh of ADPKD now on HD, HTN, afib (questionable xarelto hx), and UTIs who presents as a transfer for urological evaluation for emphysematous pyelonephritis after being treated at OSH for urosepsis. Pt was admitted to OSH on 1/25, found to have MARIZA on CKD as well as emphysematous pyelonephritis. Renal function worsened with Cr increasing to 12 per pt and he developed uremia. Temporary HD line placed in Right IJ and HD initiated. Pt now transferred to [facility] for Urology consult to assess need for further intervention. Nephrology has been consulted to manage inpatient HD. Pt has increased LE edema on initial eval, but denies any SOB, and shows no signs of uremia. Last HD session 2 days ago per pt.    Plan:  -Renal function stable, continue to follow daily labs  -received HD yesterday 2/5 now pending transfer back to [place] for Tunneled Cath placement, will repeat HD tomorrow 2/7 if pt has not transferred at that time

## 2025-02-06 NOTE — SUBJECTIVE & OBJECTIVE
Interval History: Pt received HD yesterday, NAEO. Planning to transfer back to [place] when accepted.    Review of patient's allergies indicates:   Allergen Reactions    Penicillins Other (See Comments)     Current Facility-Administered Medications   Medication Frequency    acetaminophen tablet 650 mg Q4H PRN    albuterol inhaler 2 puff Q6H PRN    albuterol-ipratropium 2.5 mg-0.5 mg/3 mL nebulizer solution 3 mL Q6H PRN    amiodarone tablet 200 mg Daily    cefTRIAXone injection 2 g Q24H    dextrose 50% injection 12.5 g PRN    dextrose 50% injection 25 g PRN    glucagon (human recombinant) injection 1 mg PRN    glucose chewable tablet 16 g PRN    glucose chewable tablet 24 g PRN    melatonin tablet 6 mg Nightly PRN    metoprolol succinate (TOPROL-XL) 24 hr tablet 25 mg Daily PM    naloxone 0.4 mg/mL injection 0.02 mg PRN    ondansetron injection 4 mg Q8H PRN    oxyCODONE immediate release tablet 5 mg Q4H PRN    sevelamer carbonate tablet 800 mg TID WM    sodium chloride 0.9% flush 10 mL Q12H PRN       Objective:     Vital Signs (Most Recent):  Temp: 98.7 °F (37.1 °C) (02/06/25 1134)  Pulse: 75 (02/06/25 1134)  Resp: 20 (02/06/25 1134)  BP: 131/73 (02/06/25 1134)  SpO2: 97 % (02/06/25 1134) Vital Signs (24h Range):  Temp:  [98.5 °F (36.9 °C)-99.6 °F (37.6 °C)] 98.7 °F (37.1 °C)  Pulse:  [70-82] 75  Resp:  [17-25] 20  SpO2:  [90 %-98 %] 97 %  BP: (112-144)/(59-75) 131/73     Weight: 109 kg (240 lb 4.8 oz) (02/05/25 0013)  Body mass index is 36.54 kg/m².  Body surface area is 2.29 meters squared.    I/O last 3 completed shifts:  In: 2100 [P.O.:1900; Other:200]  Out: 5296 [Urine:2930; Drains:265; Other:2100; Stool:1]     Physical Exam  HENT:      Head: Normocephalic and atraumatic.   Eyes:      Extraocular Movements: Extraocular movements intact.      Pupils: Pupils are equal, round, and reactive to light.   Cardiovascular:      Rate and Rhythm: Normal rate and regular rhythm.      Pulses: Normal pulses.      Heart  sounds: Normal heart sounds.   Pulmonary:      Effort: Pulmonary effort is normal.      Breath sounds: Normal breath sounds.   Abdominal:      General: Bowel sounds are normal.   Musculoskeletal:      Right lower leg: Edema present.      Left lower leg: Edema present.   Neurological:      Mental Status: He is alert.          Significant Labs:  CBC:   Recent Labs   Lab 02/06/25  0446   WBC 11.17   RBC 2.61*   HGB 7.4*   HCT 24.0*      MCV 92   MCH 28.4   MCHC 30.8*     CMP:   Recent Labs   Lab 02/06/25 0446   GLU 82   CALCIUM 7.5*   ALBUMIN 1.7*   PROT 5.4*   *   K 3.8   CO2 21*      BUN 31*   CREATININE 4.0*   ALKPHOS 74   ALT 30   AST 41*   BILITOT 0.3     All labs within the past 24 hours have been reviewed.     Significant Imaging:

## 2025-02-06 NOTE — ASSESSMENT & PLAN NOTE
Creatine stable for now. BMP reviewed- noted Estimated Creatinine Clearance: 23.2 mL/min (A) (based on SCr of 4 mg/dL (H)). according to latest data. Based on current GFR, CKD stage is end stage.  Monitor UOP and serial BMP and adjust therapy as needed. Renally dose meds. Avoid nephrotoxic medications and procedures.    -Nephro for HD, resumed on 2/5/2025   - Tunnel cath to continue dialysis to be placed once transferred back to OSH  -Will need access- has an IJ catheter in right now

## 2025-02-06 NOTE — PLAN OF CARE
Problem: Fall Injury Risk  Goal: Absence of Fall and Fall-Related Injury  Outcome: Progressing     Problem: Infection  Goal: Absence of Infection Signs and Symptoms  Outcome: Progressing     Problem: Hemodialysis  Goal: Safe, Effective Therapy Delivery  Outcome: Progressing  Goal: Effective Tissue Perfusion  Outcome: Progressing  Goal: Absence of Infection Signs and Symptoms  Outcome: Progressing

## 2025-02-06 NOTE — ASSESSMENT & PLAN NOTE
He reports taking amiodarone and xarelto at home prior to this admission. Additionally, he only reports taking the xarelto when he felt he was in afib, and was taking a half pill when he did. He also mentioned an ablation? He reports the amiodarone was stopped while he is in the hospital, but cannot provide more detail. His AC was also stopped due to hematuria while at OSH. [place] [facility] was in aifb but was rate controlled with a HR of 83.      -Will resume amiodarone, rhythm now controlled  -Continue to hold AC for possible placement of tunneled cath once transferred back to OSH

## 2025-02-07 NOTE — PLAN OF CARE
Problem: Adult Inpatient Plan of Care  Goal: Plan of Care Review  Outcome: Progressing  Goal: Patient-Specific Goal (Individualized)  Outcome: Progressing  Goal: Absence of Hospital-Acquired Illness or Injury  Outcome: Progressing  Goal: Optimal Comfort and Wellbeing  Outcome: Progressing  Goal: Readiness for Transition of Care  Outcome: Progressing     Problem: Infection  Goal: Absence of Infection Signs and Symptoms  Outcome: Progressing     Problem: Hemodialysis  Goal: Safe, Effective Therapy Delivery  Outcome: Progressing  Goal: Effective Tissue Perfusion  Outcome: Progressing  Goal: Absence of Infection Signs and Symptoms  Outcome: Progressing     Problem: Fall Injury Risk  Goal: Absence of Fall and Fall-Related Injury  Outcome: Progressing     POC in progress. Up in chair. Pt had episode of nausea,prn. Awaiting transportation's for pick between 7p-8p. Wife @bedside. No s/s of discomfort or distress.  Call light in reach.

## 2025-02-07 NOTE — PLAN OF CARE
Copy of patient's chart faxed to Daniella [phone] at [facility].    Elvin Danielle [facility]    [facility]  [phone]

## 2025-02-07 NOTE — PLAN OF CARE
[facility] - Telemetry Stepdown  Discharge Final Note    Primary Care Provider: No, Primary Doctor    Expected Discharge Date: 2/6/2025    Patient was transferred back to [facility] via stretcher.         Final Discharge Note (most recent)       Final Note - 02/07/25 0854          Final Note    Assessment Type Final Discharge Note     Anticipated Discharge Disposition Another Health Care Institution Not Defined     Hospital Resources/Appts/Education Provided Appointments scheduled and added to AVS        Post-Acute Status    Post-Acute Authorization Other   Hospital Transfer    Coverage [employer] - [employer]     Discharge Delays None known at this time                     Important Message from Medicare             Lashay Leon RN     [phone]

## 2025-02-07 NOTE — NURSING
Patient was transported back to hospital in [place]. A/A/orient times 4. Wife is present at the time patient was dischagre

## 2025-02-07 NOTE — DISCHARGE SUMMARY
[facility] - Telemetry [facility] Medicine  Discharge Summary      Patient Name: Kenneth Phillip  MRN: [id number]  SYDNEY: [id number]  Patient Class: IP- Inpatient  Admission Date: 2/4/2025  Hospital Length of Stay: 2 days  Discharge Date and Time:  02/07/2025 11:53 AM  Attending Physician: Марина att. providers found   Discharging Provider: Otoniel Barrios DO  Primary Care Provider: Марина Primary Doctor  [facility] Medicine Team: [facility] HOSP MED 2 Otoniel Barrios DO  Primary Care Team: [facility] HOSP MED 2    HPI:   59-year-old male with a history of ADPKD, episodes of MARIZA superimposed on CKD (baseline creatinine appears to be around 6-7), hypertension, atrial fibrillation (on Xarelto), and UTIs admitted to [facility] January 25 with hiccups, nausea with vomiting, dizziness, poor oral intake, dysuria, hematuria, and flank pain. Flank pain was mostly on the left. He had no fever chills, no chest pain or dyspnea, and no evidence of GI bleeding. Chest x-ray and CT head had no acute abnormalities. Labs were concerning for leukocytosis, anemia, hyponatremia, and worsened renal function. CT of the abdomen and pelvis noted 59 yoM with pmh of ADPKD now on HD, HTN, afib (questionable xarelto hx), and UTIs who presents as a transfer for urological evaluation for emphysematous pyelonephritis after being treated at OSH for urosepsis. On arrival to [facility] he was resting comfortably in his bed. He reports that his only complaint at this time is abdominal soreness from his recent drain placement, otherwise he feels significantly improved since initially presenting. He explained that about a week ago he passed and that was when his family brought him in and the urosepsis, etc was found. E coli was isolated in both his urine and blood, but he has reportedly since cleared cultures. He currently denies any fever, chills, or SOB. He does report some cough with sputum that he apparently got a CXR for and it was clear. He has some intermittent N/V as  well.    On arrival to [facility], he was HDS and AF. Most recent labs available were from 1/31 and showed: White blood cells 15.6, hemoglobin 7.3, hematocrit 24.5, platelets 231, sodium 134, potassium 4.2, chloride 101, CO2 26, BUN 56, creatinine 4.93, glucose 226. Upon arrival, labs and repeat infectious workup was ordered. Nephrology was consulted for HD needs and urology for possible nephrectomy. His ceftriaxone was continued, although I do not know the start date.    * No surgery found *      Hospital Course:   59M with PMH ADPKD with recurrent UTIs, CKD V now ESRD and HD dependent since before transfer, AFib on Xarelto admitted to OSH with sepsis d/t emphysematous pyelonephritis. Transferred to [facility] for urologic evaluation for potential surgical intervention including nephrectomy. Decompensated at OSH, needed MICU, Lt renal drains placed x3 which remain present, new ESRD. Urology consulted, no intervention recommended, pt clinically imrpoved s/p drain placement. Outpt follow up.     Plan to transfer back to [facility] (transfer back initated yesterday). Ongoing medical needs include: placement of tunneled dialysis catheter, continued inpatient HD and organization of outpatient HD, continued IV antibiotics, continued drain management and further evaluation of new 6.0 x 2.8 cm fluid collection posterolateral perirenal space noted on CT.     Physical Exam  Gen: in NAD, appears stated age  Neuro: AAOx3, motor, sensory, and strength grossly intact BL  HEENT: NTNC, EOMI, PERRL, MMM  CVS: RRR, no m/r/g; S1/S2 auscultated with no S3 or S4; capillary refill < 2 sec  Resp: lungs CTAB, no w/r/r; no belabored breathing or accessory muscle use appreciated   Abd: BS+ in all 4 quadrants; NTND, soft to palpation; no organomegaly appreciated. Three abd drains present draining SS fluids  Extrem: pulses full, equal, and regular over all 4 extremities; no UE or LE edema BL    Goals of Care Treatment Preferences:  Code Status:  Full Code         Consults:   Consults (From admission, onward)          Status Ordering Provider     Inpatient consult to Infectious Diseases  Once        Provider:  (Not yet assigned)    Completed ASHER FLORES III     Inpatient consult to Urology  Once        Provider:  (Not yet assigned)    Completed SHERRY KHALIL     Inpatient consult to Nephrology  Once        Provider:  (Not yet assigned)    Completed SHERRY KHALIL            * Emphysematous pyelonephritis  59 yoM with pmh of ADPKD now on HD, HTN, afib (questionable xarelto hx), and UTIs who presents as a transfer for urological evaluation for emphysematous pyelonephritis after being treated at OSH for urosepsis. There he was found to have a UTI and bacteremia secondary to E. Coli. CT A/P showed: Patient has developed emphysematous pyelonephritis on the left since prior study with multiple areas of gas scattered about the renal cortex and within renal cysts. Perinephric edema has also developed. 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 Multiple cysts in the liver also noted. He required MICU for vasopressors and was started on HD as well. Infection improved with abx and surgical drainage. He has apparently cleared cultures and the E coli is sensitive to ceftriaxone. Clinically, he appears much improved since presentation. On arrival to [facility] he is HDS and AF.     UA: cloudy with 3+ leukocytes, >100 wbc, and moderate bacteria; urine clx negative  2/4 blood clx with NGTD    PLAN:  -Continue ceftriaxone; ID consulted, appreciate recs for duration of abx treatment   - Plan to treat with IV ceftriaxone for 2 weeks with outpatient ID follow-up towards end of course  -Repeat infectious work-up done here negative  -Urology consulted for possible nephrectomy, no surgical intervention indicated   - [facility]  - Transfer back to OSH initiated           Cough  Reports a productive cough developing the last several days. Spo2 96% on arrival today. Says he had a CXR at OSH that was without PNA, but this was on 1/25.    -F/u repeat CXR  -Monitor respiratory status.      Former smoker  Reports smoking from age [age]. Some wheezing heard on exam but no SOB.    -Consider outpatient PFTs  -Albuterol and duonebs prn      Obesity (BMI 30-39.9)  There is no height or weight on file to calculate BMI. Morbid obesity complicates all aspects of disease management from diagnostic modalities to treatment. Weight loss encouraged and health benefits explained to patient.         ESRD (end stage renal disease)  Creatine stable for now. BMP reviewed- noted Estimated Creatinine Clearance: 23.2 mL/min (A) (based on SCr of 4 mg/dL (H)). according to latest data. Based on current GFR, CKD stage is end stage.  Monitor UOP and serial BMP and adjust therapy as needed. Renally dose meds. Avoid nephrotoxic medications and procedures.    -Nephro for HD, resumed on 2/5/2025   - Tunnel cath to continue dialysis to be placed once transferred back to OSH  -Will need access- has an IJ catheter in right now    Hypertension  Patient's blood pressure range in the last 24 hours was: BP  Min: 112/61  Max: 144/75.The patient's inpatient anti-hypertensive regimen is listed below:  Current Antihypertensives  metoprolol succinate (TOPROL-XL) 24 hr tablet 25 mg, Every evening, Oral  , Daily, OralToprol 25  Amlodipine 5      BP Readings from Last 1 Encounters:   02/06/25 131/73         Plan  - Restarted Toprol 25 mg daily  - Will hold amlodipine until additional BP control required     Atrial fibrillation  He reports taking amiodarone and xarelto at home prior to this admission. Additionally, he only reports taking the xarelto when he felt he was in afib, and was taking a half pill when he did. He also mentioned an ablation? He reports the amiodarone  was stopped while he is in the hospital, but cannot provide more detail. His AC was also stopped due to hematuria while at OSH. On [facility], he was in aifb but was rate controlled with a HR of 83.      -Will resume amiodarone, rhythm now controlled  -Continue to hold AC for possible placement of tunneled cath once transferred back to OSH      Autosomal dominant polycystic kidney disease  Patient has had longstanding history of kidney disease. It appears HD had been on the table but this recent infection set him over the edge and he became ESRD. He is still hoping for transplant eventually.         Final Active Diagnoses:    Diagnosis Date Noted POA    PRINCIPAL PROBLEM:  Emphysematous pyelonephritis [N12] 02/04/2025 Yes    Hypertension [I10] 02/04/2025 Yes    ESRD (end stage renal disease) [N18.6] 02/04/2025 Yes    Obesity (BMI 30-39.9) [E66.9] 02/04/2025 Yes    Former smoker [Z87.891] 02/04/2025 Not Applicable    Autosomal dominant polycystic kidney disease [Q61.2] 04/16/2023 Not Applicable    Atrial fibrillation [I48.91] 04/16/2023 Yes      Problems Resolved During this Admission:       Discharged Condition: fair    Disposition: Another Health Care Inst*    Follow Up:    Patient Instructions:   No discharge procedures on file.    Significant Diagnostic Studies: N/A    Pending Diagnostic Studies:       Procedure Component Value Units Date/Time    Urinalysis [[id number]] Collected: 02/04/25 1138    Order Status: Sent Lab Status: In process Updated: 02/04/25 1138    Specimen: Urine            Medications:  Reconciled Home Medications:      Medication List        ASK your doctor about these medications      amiodarone 200 MG Tab  Commonly known as: PACERONE  Take 200 mg by mouth 3 (three) times daily.     amLODIPine 5 MG tablet  Commonly known as: NORVASC  Take 5 mg by mouth once daily.     JYNARQUE 45 mg (AM)/ 15 mg (PM) Tbsq  Generic drug: tolvaptan (polycys kidney dis)     metoprolol succinate 25 MG 24 hr  tablet  Commonly known as: TOPROL-XL  Take 25 mg by mouth once daily.     pantoprazole 40 MG tablet  Commonly known as: PROTONIX  Take 1 tablet by mouth once daily.     XARELTO 20 mg Tab  Generic drug: rivaroxaban  Take 1 tablet by mouth once daily.              Indwelling Lines/Drains at time of discharge:   Lines/Drains/Airways       Central Venous Catheter Line  Duration             Trialysis (Dialysis) Catheter 02/03/25 0130 right internal jugular 4 days              Drain  Duration                  Closed/Suction Drain 02/04/25 0130 Tube - 1 Lateral LUQ Bulb 3 days         Closed/Suction Drain 02/04/25 0130 Tube - 2 Lateral LLQ Bulb 3 days         Closed/Suction Drain 02/04/25 0130 Tube - 3 Lateral LLQ Bulb 3 days                    Time spent on the discharge of patient: 35 minutes         Otoniel Barrios DO  Department of Hospital Medicine  [facility] - Telemetry Stepdown

## 2025-02-09 LAB
BACTERIA BLD CULT: NORMAL
BACTERIA BLD CULT: NORMAL

## 2025-02-18 ENCOUNTER — TELEPHONE (OUTPATIENT)
Dept: TRANSPLANT | Facility: CLINIC | Age: 60
End: 2025-02-18
Payer: COMMERCIAL

## 2025-02-27 ENCOUNTER — TELEPHONE (OUTPATIENT)
Dept: TRANSPLANT | Facility: CLINIC | Age: 60
End: 2025-02-27
Payer: COMMERCIAL

## 2025-02-27 NOTE — TELEPHONE ENCOUNTER
----- Message from Den Urbano MD sent at 2/26/2025  5:26 PM CST -----  Large polycystic kidneys.  Some calcifications but not prohibitive. High risk.  ----- Message -----  From: Ella Hemphill RN  Sent: 2/20/2025   2:15 PM CST  To: Den Urbano Jr., MD    Please review for iliac calcifications  Ordered while pt inpatient.  ThanksElla

## 2025-03-06 ENCOUNTER — TELEPHONE (OUTPATIENT)
Dept: TRANSPLANT | Facility: CLINIC | Age: 60
End: 2025-03-06
Payer: COMMERCIAL

## 2025-03-06 NOTE — TELEPHONE ENCOUNTER
I called patient and informed him that he is now re-activated on the kidney wailtist for offers. Verbalized understanding.  ----- Message from Ella Hemphill sent at 3/6/2025 12:56 PM CST -----  Regarding: FW: Added back on waitlist  Contact: Kenneth    ----- Message -----  From: Raquel Barrera  Sent: 3/6/2025  12:02 PM CST  To: Kidney Waitlisted Coordinator  Subject: Added back on waitlist                           Consult/Advisory Name Of Caller:Kenneth Phillip  Contact Preference: [phone] (home) , requesting a call back.  Nature of call:  Pt wanted to inform coordinator that he is know longer on antibiotics, would like to be put back on the wait list.

## 2025-03-10 PROCEDURE — 86833 HLA CLASS II HIGH DEFIN QUAL: CPT | Mod: TXP | Performed by: NURSE PRACTITIONER

## 2025-03-10 PROCEDURE — 86832 HLA CLASS I HIGH DEFIN QUAL: CPT | Mod: TXP | Performed by: NURSE PRACTITIONER

## 2025-03-12 ENCOUNTER — LAB VISIT (OUTPATIENT)
Dept: LAB | Facility: HOSPITAL | Age: 60
End: 2025-03-12
Payer: COMMERCIAL

## 2025-03-12 DIAGNOSIS — Z76.82 ORGAN TRANSPLANT CANDIDATE: ICD-10-CM

## 2025-03-17 LAB — HPRA INTERPRETATION: NORMAL

## 2025-03-31 ENCOUNTER — TELEPHONE (OUTPATIENT)
Dept: TRANSPLANT | Facility: CLINIC | Age: 60
End: 2025-03-31
Payer: COMMERCIAL

## 2025-04-04 PROCEDURE — 36415 COLL VENOUS BLD VENIPUNCTURE: CPT | Mod: TXP

## 2025-04-04 PROCEDURE — 99001 SPECIMEN HANDLING PT-LAB: CPT | Mod: TXP | Performed by: NURSE PRACTITIONER

## 2025-04-10 ENCOUNTER — LAB VISIT (OUTPATIENT)
Dept: LAB | Facility: HOSPITAL | Age: 60
End: 2025-04-10
Payer: COMMERCIAL

## 2025-04-10 DIAGNOSIS — Z76.82 AWAITING ORGAN TRANSPLANT STATUS: ICD-10-CM

## 2025-05-07 PROCEDURE — 36415 COLL VENOUS BLD VENIPUNCTURE: CPT | Mod: TXP

## 2025-05-07 PROCEDURE — 86832 HLA CLASS I HIGH DEFIN QUAL: CPT | Mod: TXP | Performed by: NURSE PRACTITIONER

## 2025-05-07 PROCEDURE — 86833 HLA CLASS II HIGH DEFIN QUAL: CPT | Mod: TXP | Performed by: NURSE PRACTITIONER

## 2025-05-13 ENCOUNTER — LAB VISIT (OUTPATIENT)
Dept: LAB | Facility: HOSPITAL | Age: 60
End: 2025-05-13
Payer: COMMERCIAL

## 2025-05-13 DIAGNOSIS — N18.6 END STAGE RENAL DISEASE: ICD-10-CM

## 2025-05-13 DIAGNOSIS — Z76.82 AWAITING ORGAN TRANSPLANT STATUS: ICD-10-CM

## 2025-05-13 DIAGNOSIS — Z76.82 ORGAN TRANSPLANT CANDIDATE: ICD-10-CM

## 2025-05-13 DIAGNOSIS — Z76.82 AWAITING ORGAN TRANSPLANT: Primary | ICD-10-CM

## 2025-05-16 LAB — HPRA INTERPRETATION: NORMAL

## 2025-05-23 LAB
CLASS I ANTIBODIES - LUMINEX: NORMAL
CLASS I ANTIBODY COMMENTS - LUMINEX: NORMAL
CLASS II ANTIBODIES - LUMINEX: NEGATIVE
CPRA %: 11
SERUM COLLECTION DT - LUMINEX CLASS I: NORMAL
SERUM COLLECTION DT - LUMINEX CLASS II: NORMAL
SPCL1 TESTING DATE: NORMAL
SPCL2 TESTING DATE: NORMAL
SPCLU TESTING DATE: NORMAL

## 2025-05-28 ENCOUNTER — TELEPHONE (OUTPATIENT)
Dept: TRANSPLANT | Facility: CLINIC | Age: 60
End: 2025-05-28
Payer: COMMERCIAL

## 2025-05-28 NOTE — TELEPHONE ENCOUNTER
Spoke to pt to scheduled annual testing and clinic visit. Pt stated he will be getting on another transplant list and will have testing completed there.

## 2025-06-04 PROCEDURE — 36415 COLL VENOUS BLD VENIPUNCTURE: CPT | Mod: TXP

## 2025-06-05 ENCOUNTER — TELEPHONE (OUTPATIENT)
Dept: TRANSPLANT | Facility: CLINIC | Age: 60
End: 2025-06-05
Payer: COMMERCIAL

## 2025-06-16 ENCOUNTER — LAB VISIT (OUTPATIENT)
Dept: LAB | Facility: HOSPITAL | Age: 60
End: 2025-06-16
Payer: COMMERCIAL

## 2025-06-16 DIAGNOSIS — Z76.82 AWAITING ORGAN TRANSPLANT: ICD-10-CM

## 2025-07-14 PROCEDURE — 36415 COLL VENOUS BLD VENIPUNCTURE: CPT | Mod: TXP

## 2025-07-14 PROCEDURE — 86833 HLA CLASS II HIGH DEFIN QUAL: CPT | Mod: TXP | Performed by: NURSE PRACTITIONER

## 2025-07-14 PROCEDURE — 86832 HLA CLASS I HIGH DEFIN QUAL: CPT | Mod: TXP | Performed by: NURSE PRACTITIONER

## 2025-07-21 ENCOUNTER — LAB VISIT (OUTPATIENT)
Dept: LAB | Facility: HOSPITAL | Age: 60
End: 2025-07-21
Payer: COMMERCIAL

## 2025-07-21 DIAGNOSIS — Z76.82 AWAITING ORGAN TRANSPLANT: ICD-10-CM

## 2025-07-24 LAB — HPRA INTERPRETATION: NORMAL

## 2025-07-31 ENCOUNTER — TELEPHONE (OUTPATIENT)
Dept: TRANSPLANT | Facility: CLINIC | Age: 60
End: 2025-07-31
Payer: COMMERCIAL

## 2025-08-05 ENCOUNTER — HOSPITAL ENCOUNTER (OUTPATIENT)
Dept: RADIOLOGY | Facility: HOSPITAL | Age: 60
Discharge: HOME | End: 2025-08-05
Attending: NURSE PRACTITIONER
Payer: COMMERCIAL

## 2025-08-05 ENCOUNTER — HOSPITAL ENCOUNTER (OUTPATIENT)
Dept: CARDIOLOGY | Facility: HOSPITAL | Age: 60
Discharge: HOME | End: 2025-08-05
Attending: NURSE PRACTITIONER
Payer: COMMERCIAL

## 2025-08-05 ENCOUNTER — OFFICE VISIT (OUTPATIENT)
Dept: TRANSPLANT | Facility: CLINIC | Age: 60
End: 2025-08-05
Payer: COMMERCIAL

## 2025-08-05 ENCOUNTER — TELEPHONE (OUTPATIENT)
Dept: TRANSPLANT | Facility: CLINIC | Age: 60
End: 2025-08-05
Payer: COMMERCIAL

## 2025-08-05 VITALS
DIASTOLIC BLOOD PRESSURE: 93 MMHG | OXYGEN SATURATION: 99 % | SYSTOLIC BLOOD PRESSURE: 161 MMHG | WEIGHT: 236.13 LBS | RESPIRATION RATE: 18 BRPM | BODY MASS INDEX: 35.79 KG/M2 | HEART RATE: 70 BPM | TEMPERATURE: 97 F | HEIGHT: 68 IN

## 2025-08-05 VITALS
HEART RATE: 85 BPM | DIASTOLIC BLOOD PRESSURE: 87 MMHG | SYSTOLIC BLOOD PRESSURE: 139 MMHG | BODY MASS INDEX: 36.42 KG/M2 | WEIGHT: 240.31 LBS | HEIGHT: 68 IN

## 2025-08-05 DIAGNOSIS — N18.5 BENIGN HYPERTENSION WITH CKD (CHRONIC KIDNEY DISEASE) STAGE V: ICD-10-CM

## 2025-08-05 DIAGNOSIS — N25.81 SECONDARY HYPERPARATHYROIDISM: ICD-10-CM

## 2025-08-05 DIAGNOSIS — Z76.82 AWAITING ORGAN TRANSPLANT: ICD-10-CM

## 2025-08-05 DIAGNOSIS — E66.812 CLASS 2 SEVERE OBESITY DUE TO EXCESS CALORIES WITH SERIOUS COMORBIDITY AND BODY MASS INDEX (BMI) OF 36.0 TO 36.9 IN ADULT: ICD-10-CM

## 2025-08-05 DIAGNOSIS — I12.0 BENIGN HYPERTENSION WITH CKD (CHRONIC KIDNEY DISEASE) STAGE V: ICD-10-CM

## 2025-08-05 DIAGNOSIS — Z76.82 PATIENT ON WAITING LIST FOR KIDNEY TRANSPLANT: Primary | ICD-10-CM

## 2025-08-05 DIAGNOSIS — E66.01 CLASS 2 SEVERE OBESITY DUE TO EXCESS CALORIES WITH SERIOUS COMORBIDITY AND BODY MASS INDEX (BMI) OF 36.0 TO 36.9 IN ADULT: ICD-10-CM

## 2025-08-05 PROBLEM — N18.6 ESRD (END STAGE RENAL DISEASE): Status: RESOLVED | Noted: 2025-02-04 | Resolved: 2025-08-05

## 2025-08-05 PROBLEM — E66.9 OBESITY (BMI 30-39.9): Status: RESOLVED | Noted: 2025-02-04 | Resolved: 2025-08-05

## 2025-08-05 PROBLEM — I10 HYPERTENSION: Status: RESOLVED | Noted: 2025-02-04 | Resolved: 2025-08-05

## 2025-08-05 LAB
AORTIC SIZE INDEX (SOV): 1.7 CM/M2
AORTIC SIZE INDEX: 1.7 CM/M2
ASCENDING AORTA: 3.7 CM
AV AREA BY CONTINUOUS VTI: 3.2 CM2
AV INDEX (PROSTH): 0.89
AV LVOT MEAN GRADIENT: 3 MMHG
AV LVOT PEAK GRADIENT: 5 MMHG
AV MEAN GRADIENT: 4 MMHG
AV PEAK GRADIENT: 7 MMHG
AV VALVE AREA BY VELOCITY RATIO: 2.9 CM²
AV VALVE AREA: 3.1 CM2
AV VELOCITY RATIO: 0.85
BSA FOR ECHO PROCEDURE: 2.29 M2
CV ECHO LV RWT: 0.37 CM
DOP CALC AO PEAK VEL: 1.3 M/S
DOP CALC AO VTI: 23.1 CM
DOP CALC LVOT AREA: 3.5 CM2
DOP CALC LVOT DIAMETER: 2.1 CM
DOP CALC LVOT PEAK VEL: 1.1 M/S
DOP CALCLVOT PEAK VEL VTI: 20.6 CM
E WAVE DECELERATION TIME: 310 MS
E/A RATIO: 0.81
E/E' RATIO: 8 M/S
ECHO EF ESTIMATED: 69 %
ECHO LV POSTERIOR WALL: 1 CM (ref 0.6–1.1)
FRACTIONAL SHORTENING: 38.9 % (ref 28–44)
INTERVENTRICULAR SEPTUM: 1 CM (ref 0.6–1.1)
IVRT: 110 MS
LA MAJOR: 5.9 CM
LA MINOR: 5.1 CM
LA WIDTH: 4 CM
LEFT ATRIUM SIZE: 5.1 CM
LEFT ATRIUM VOLUME INDEX MOD: 35 ML/M2
LEFT ATRIUM VOLUME INDEX: 43 ML/M2
LEFT ATRIUM VOLUME MOD: 77 ML
LEFT ATRIUM VOLUME: 95 CM3
LEFT INTERNAL DIMENSION IN SYSTOLE: 3.3 CM (ref 2.1–4)
LEFT VENTRICLE DIASTOLIC VOLUME INDEX: 63.8 ML/M2
LEFT VENTRICLE DIASTOLIC VOLUME: 141 ML
LEFT VENTRICLE MASS INDEX: 93.5 G/M2
LEFT VENTRICLE SYSTOLIC VOLUME INDEX: 19.5 ML/M2
LEFT VENTRICLE SYSTOLIC VOLUME: 43 ML
LEFT VENTRICULAR INTERNAL DIMENSION IN DIASTOLE: 5.4 CM (ref 3.5–6)
LEFT VENTRICULAR MASS: 206.7 G
LV LATERAL E/E' RATIO: 7.3
LV SEPTAL E/E' RATIO: 9.7
Lab: 2.1 CM/M
Lab: 2.2 CM/M
MV A" WAVE DURATION": 129.4 MS
MV PEAK A VEL: 0.72 M/S
MV PEAK E VEL: 0.58 M/S
OHS CV CPX PATIENT HEIGHT IN: 68
OHS CV RV/LV RATIO: 0.76 CM
PISA TR MAX VEL: 2 M/S
PULM VEIN A" WAVE DURATION": 129.4 MS
PULM VEIN S/D RATIO: 1.69
PULMONIC VEIN PEAK A VELOCITY: 0.4 M/S
PV PEAK D VEL: 0.32 M/S
PV PEAK S VEL: 0.54 M/S
RA MAJOR: 4.36 CM
RA PRESSURE ESTIMATED: 3 MMHG
RA WIDTH: 3.77 CM
RIGHT ATRIAL AREA: 14.1 CM2
RIGHT VENTRICLE DIASTOLIC BASEL DIMENSION: 4.1 CM
RV TB RVSP: 5 MMHG
RV TISSUE DOPPLER FREE WALL SYSTOLIC VELOCITY 1 (APICAL 4 CHAMBER VIEW): 9.71 CM/S
SINUS: 3.8 CM
STJ: 2.9 CM
TDI LATERAL: 0.08 M/S
TDI SEPTAL: 0.06 M/S
TDI: 0.07 M/S
TRICUSPID ANNULAR PLANE SYSTOLIC EXCURSION: 2.4 CM
TV PEAK GRADIENT: 17 MMHG
TV REST PULMONARY ARTERY PRESSURE: 19 MMHG
Z-SCORE OF LEFT VENTRICULAR DIMENSION IN END DIASTOLE: -3.46
Z-SCORE OF LEFT VENTRICULAR DIMENSION IN END SYSTOLE: -2.68

## 2025-08-05 PROCEDURE — 76770 US EXAM ABDO BACK WALL COMP: CPT | Mod: TC,TXP

## 2025-08-05 PROCEDURE — 99999 PR PBB SHADOW E&M-EST. PATIENT-LVL IV: CPT | Mod: PBBFAC,TXP,, | Performed by: NURSE PRACTITIONER

## 2025-08-05 PROCEDURE — 99215 OFFICE O/P EST HI 40 MIN: CPT | Mod: S$GLB,TXP,, | Performed by: NURSE PRACTITIONER

## 2025-08-05 PROCEDURE — 93306 TTE W/DOPPLER COMPLETE: CPT | Mod: TXP

## 2025-08-05 RX ORDER — MECLIZINE HYDROCHLORIDE 25 MG/1
25 TABLET ORAL
COMMUNITY
Start: 2025-07-04

## 2025-08-05 RX ORDER — CEFPODOXIME PROXETIL 100 MG/1
100 TABLET, FILM COATED ORAL NIGHTLY
COMMUNITY

## 2025-08-05 RX ORDER — TORSEMIDE 100 MG/1
50 TABLET ORAL DAILY
COMMUNITY
Start: 2025-06-25

## 2025-08-05 NOTE — PROGRESS NOTES
Transplant Recipient Adult Psychosocial Assessment Update (Last assessment completed on 2024)    SW completed assessment with patient and pt's wife Ira Phillip in clinic.    Kenneth Phillip  [address] [facility] [address]  Telephone Information:   Mobile [phone]   Home  [phone] (home)  Work  There is no work phone number on file.  E-mail  [email or web address]    Sex: male  YOB: 1965  Age: 60 y.o.    Encounter Date: 2025  [place] Citizen: yes  Primary Language: English   Needed: no    Emergency Contact:  Name: Ira Phillip  Relationship: wife  Address: same as patient  Phone Numbers: [phone] (mobile) / [phone] (home)    Family/Social Support:   Number of dependents/: Pt denies having any dependents.  Marital history: Pt reports he and wife Ira have been together 20+ years.  Other family dynamics: Patient's parents are .  Pt has one adult son living in [place] and one adult daughter living near pt in [place], [place].  Patient has one sister Slime Phillip who is a liver / kidney transplant recipient at [facility].    Household Composition:  Name: Kenneth Phillip  Age: 60  Relationship: patient  Does person drive? yes    Name: Ira Phillip  Age: 60  Relationship: wife  Does person drive? yes    Do you and your caregivers have access to reliable transportation? yes  PRIMARY CAREGIVER: Ira Phillip, pt's wife, will be primary caregiver, phone number [phone].      provided in-depth information to patient and caregiver regarding pre- and post-transplant caregiver role.   strongly encourages patient and caregiver to have concrete plan regarding post-transplant care giving, including back-up caregiver(s) to ensure care giving needs are met as needed.    Patient and Caregiver states understanding all aspects of caregiver role/commitment and is able/willing/committed to being caregiver to the fullest extent necessary.    Patient  and Caregiver verbalizes understanding of the education provided today and caregiver responsibilities.         remains available. Patient and Caregiver agree to contact  in a timely manner if concerns arise.      Able to take time off work without financial concerns: N/A - Ira does not work.     Additional Significant Others who will Assist with Transplant:  Patient denies having additional caregivers identified at this time.  SW strongly encouraged pt to speak with family and friends to identify additional caregivers who can provide support during pt's transplant recovery in the event pt's wife rIa would be unable to do so.    Living Will: no  Healthcare Power of : no Pt reports trusting wife with medical decisions as needed   Advance Directives on file: <<no information> per medical record.  Verbally reviewed LW/HCPA information.   provided patient with copy of LW/HCPA documents and provided education on completion of forms.    Living Donors: No. Education and resource information given to patient.    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  Pt reports retiring in January 2023.    Spouse/Significant Other Employment: Pt's wife Ira does not work.    Disabled: no     Monthly Income:  CHCF: $2,500 (via pt's 401k)  Able to afford all costs now and if transplanted, including medications: yes  Patient and Caregiver verbalizes understanding of personal responsibilities related to transplant costs and the importance of having a financial plan to ensure that  patients transplant costs are fully covered.      provided fundraising information/education.  Patient and Caregiver verbalizes understanding.   remains available.    Insurance:  Payer/Plan Subscr  Sex Relation Sub. Ins. ID Effective Group Num   1. [employer]* SUZE LAYNE 1965 Male Self [id number] 3/1/23                                    [address]   2. LIF[employer]RAC NETW* SUZE LAYNE 1965 Male Self [id number]                                    [address] [facility] [id number]     Primary Insurance (for UNOS reporting): Private Insurance - via Healthcare Marketplace / Obamacare  Secondary Insurance (for UNOS reporting): None -   Patient and Caregiver verbalizes clear understanding that patient may experience difficulty obtaining and/or be denied insurance coverage post-surgery. This includes and is not limited to disability insurance, life insurance, health insurance, burial insurance, long term care insurance, and other insurances.    Patient and Caregiver also reports understanding that future health concerns related to or unrelated to transplantation may not be covered by patient's insurance.  Resources and information provided and reviewed.      Patient and Caregiver provides verbal permission to release any necessary information to outside resources for patient care and discharge planning.  Resources and information provided are reviewed.      Dialysis Adherence:  Patient reports starting hemodialysis post hospital admit in 2024. Pt reports as highly compliant with hemodialysis treatments.    Infusion Service: patient utilizing? no  Home Health: patient utilizing? yes Post  hospital admit, pt utilized [facility]  for 3 weeks for vitals  DME: yes - CPAP (does not use due to weight loss); cane; crutches; walker - pt only required use of DME while recovering from knee replacement surgery. Pt no longer utilizes, as knee is functioning well and pt is very active.    Pulmonary/Cardiac Rehab: no   ADLS:  Pt reports being independent with all ADLs and mobility and driving himself.    Adherence:   Pt/caregiver reports pt has exhibited good adherence to medication regimen, appointment schedule, and medical recommendations in the past.  Adherence education and counseling provided.     Per History Section:  Past Medical History:   Diagnosis Date    Anemia     Atrial fibrillation     Disorder of kidney and ureter     GERD (gastroesophageal reflux disease)     Hypertension     Obesity     Polycystic kidney disease      Social History     Tobacco Use    Smoking status: Former     Current packs/day: 0.00     Types: Cigarettes     Quit date: 2013     Years since quittin.8    Smokeless tobacco: Never   Substance Use Topics    Alcohol use: Not on file     Social History     Substance and Sexual Activity   Drug Use Not on file     Social History     Substance and Sexual Activity   Sexual Activity Not on file       Per Today's Psychosocial:  Tobacco: none, patient denies any use.  Pt reports quitting smoking at age 48 and was smoking 1 ppd prior to quitting.  Alcohol: Pt denies current use  Illicit Drugs/Non-prescribed Medications: none, patient denies any use.  Pt reports maintaining prescription for opioid pain medication prescribed by nephrologist due to pain associated with PKD.    Patient and Caregiver states clear understanding of the potential impact of substance use as it relates to transplant candidacy and is aware of possible random substance screening.  Substance abstinence/cessation counseling, education and resources provided and reviewed.     Arrests/DWI/Treatment/Rehab: patient denies    Psychiatric History:    Mental Health: Pt denies any past and/or present mental health symptoms and/or diagnoses.  Psychiatrist/Counselor: Pt denies ever receiving care from psychiatrist/counselor.  Medications:  Pt denies ever being prescribed medications for mental health  care.  Suicide/Homicide Issues: Pt denies any past and/or present SI/HI.   Safety at home: Pt denies having any past or present concerns regarding safety at home including but not limited to physical/emotional/sexual abuse, neglect, or exploitation.    Knowledge: Patient and Caregiver states having clear understanding and realistic expectations regarding the potential risks and potential benefits of organ transplantation and organ donation, agrees to discuss with health care team members and support system members, and to utilize available resources and express questions and/or concerns in order to further facilitate the pt informed decision-making.  Resources and information provided and reviewed.     Patient and Caregiver is aware of [facility] affiliation and/or partnership with agencies in home health care, LTAC, SNF, [facility], and other hospitals and clinics.    Understanding: Patient and Caregiver reports having a clear understanding of the many lifetime commitments involved with being a transplant recipient, including costs, compliance, medications, lab work, procedures, appointments, concrete and financial planning, preparedness, timely and appropriate communication of concerns, abstinence (ETOH, tobacco, illicit non-prescribed drugs), adherence to all health care team recommendations, support system and caregiver involvement, appropriate and timely resource utilization and follow-through, mental health counseling as needed/recommended, and patient and caregiver responsibilities.  Social Service Handbook, resources and detailed educational information provided and reviewed.  Educational information provided.    Patient and Caregiver also reports current and expected compliance with health care regime and states having a clear understanding of the importance of compliance.      Patient and Caregiver reports a clear understanding that risks and benefits may be involved with organ transplantation and with organ  donation.      Patient and Caregiver also reports clear understanding that psychosocial risk factors may affect patient, and include but are not limited to feelings of depression, generalized anxiety, anxiety regarding dependence on others, post traumatic stress disorder, feelings of guilt and other emotional and/or mental concerns, and/or exacerbation of existing mental health concerns.  Detailed resources provided and discussed.     Patient and Caregiver agrees to access appropriate resources in a timely manner as needed and/or as recommended, and to communicate concerns appropriately.  Patient and Caregiver also reports a clear understanding of treatment options available.      reviewed education, provided additional information, and answered questions.    Feelings or Concerns: Pt expresses motivation to continue pursuing transplant and denies any transplant related concerns at this time.    Coping: Identify Patient & Caregiver Strategies to [place]:   1. In the past - hunting / fishing; family support, yard work, exercise    2. Currently & Pre-transplant - same as above   3. At the time of surgery - family support   4. During post-Transplant & Recovery Period - all of the above    Goals: Pt reports post-transplant goals include living longer, vacationing, and enjoying custodial.  Patient referred to Vocational Rehabilitation.    Interview Behavior: Patient and Caregiver presents as alert and oriented x 4, pleasant, good eye contact, well groomed, recall good, concentration/judgement good, average intelligence, calm, communicative, cooperative, and asking and answering questions appropriately.  Patient accompanied to clinic by wife Ira Phillip who presents as highly attentive, engaged, and supportive in pt's pursuit of transplant.         Transplant Social Work - Candidacy  Assessment/Plan:     Psychosocial Suitability: Based on psychosocial risk factors, patient presents as low risk candidate for  kidney transplant at this time due to established caregiver plan, no documented non-adherence to nephrology plan of care, no reported history of substance abuse and/or mental health concerns, adequate insurance coverage and personal finances to cover transplant cost, and realistic beliefs and expectations regarding risks and benefits of transplant.  Patient would benefit from identifying secondary caregiver support in case wife / primary caregiver is ever unable to provide support during pt's transplant recovery.    Recommendations/Additional Comments: SW recommends pt establish local lodging plan for immediate post-transplant recovery period.  SW recommends pt identify secondary caregiver support.  SW recommends that pt conduct fundraising to assist pt with pay for cost of medications, food, gas, and other transplant related needs.  SW recommends that pt remain aware of potential mental health concerns and contact the team if any concerns arise.  SW recommends that pt remain abstinent from tobacco, ETOH, and drug use.  SW supports pt's continued adherence. SW remains available to answer any questions or concerns that arise as the pt moves through the transplant process.     Riccardo Mitchell, MSW, LMSW

## 2025-08-05 NOTE — PROGRESS NOTES
Kidney Transplant Recipient Reevalulation    Referring Physician: Sidney Broussard  Current Nephrologist: Sidney Broussard  Waitlist Status: active  Dialysis Start Date:     Subjective:     CC:  Annual reassessment of kidney transplant candidacy.    HPI:  Mr. Phillip is a 60 y.o. year old White male with advanced kidney disease secondary to polycystic kidney disease.  He has been on the wait list for a kidney transplant at [facility] since 3/12/2024. Patient is currently on hemodialysis started on 1/2025. Patient is dialyzing on MWF schedule.  Patient reports that he is tolerating dialysis well.. He has  chest catheter .    recent hospitalizations or ED visits.    1/2025 -hospitalized with Ruptured Cyst-infected   -current issue -Recurrent UTI   Is taking cefpodoxime 100 mg daily for UTI prophylaxis    -follows with Dr Gary /urology     LOV 8/27/24 in txp clinic      Fx assessment:  s/p right knee replacement 2023.  He continues walking  a few times per week and will walk  1 mile. He does not appear frail. Denies CARD or chest pain.     HX Afib   On amiodarone    Cardiologist : Dr Man   No longer on AC     Lab /diagnostic results /TXP [facility] reviewed    8/5/25 Renal US: Polycystic kidney disease with no suspicious mass. No significant change compared to prior exam.   8/5/25 TTE:  EF 60-65%, PA 19  1/25/25 (CE) renal US: Findings of adult polycystic kidney disease. No hydronephrosis or   calculus disease seen.   2/4/25 CXR:lungs are clear no obvious infiltrate is noted.    9/27/23 PXR: Mild vascular calcifications   2/4/25 CTA/P WO:  Vasculature: Moderate to advanced atherosclerotic disease of the abdominal aorta. No aneurysm.     9/25/24 Cardiology clearance (see check list)   9/14/24 LHC: non obstructing CAD    Lexiscan 8/19/24 :  Abnormal myocardial perfusion scan.    There are three significant perfusion abnormalities.    Perfusion Abnormality #1 - There is a moderate intensity, medium sized, reversible  perfusion abnormality that is consistent with ischemia in the basal to distal inferior wall(s) in the typical distribution of the RCA territory.    Perfusion Abnormality #2 - There is a small to medium sized, moderate intensity, reversible perfusion abnormality that is consistent with ischemia in the basal to distal anterior wall(s) in the typical distribution of the diagonal territory.    Perfusion Abnormality #3 - There is a small to medium sized, moderate intensity, mostly reversible perfusion abnormality with some fixed areas in the basal to mid inferior and inferolateral wall(s) in the typical distribution of the LCX territory.    There are no other significant perfusion abnormalities.    The gated perfusion images showed an ejection fraction of 58% at rest. The gated perfusion images showed an ejection fraction of 55% post stress. Normal ejection fraction is greater than 47%.    There is regional hypokinetic wall motion at rest in the basal to mid lateral wall(s). There is regional hypokinetic wall motion during post-stress imaging in the basal to mid lateral wall(s) and basal to mid inferior wall(s).    LV cavity size is normal at rest and normal at post-stress.    The ECG portion of the study is negative for ischemia.    The patient reported no chest pain during the stress test.    There were no arrhythmias during stress.    There are no prior studies for comparison.         PSA, Screen (ng/mL)   Date Value   08/27/2024 0.67     Past Medical History:   Diagnosis Date    Anemia     Atrial fibrillation     Disorder of kidney and ureter     GERD (gastroesophageal reflux disease)     Hypertension     Obesity     Polycystic kidney disease        Review of Systems   Constitutional:  Positive for fatigue. Negative for activity change, appetite change, chills, fever and unexpected weight change.   HENT:  Negative for congestion, facial swelling, postnasal drip, rhinorrhea, sinus pressure, sore throat and trouble  "swallowing.    Eyes:  Negative for pain, redness and visual disturbance (wears glasses).   Respiratory:  Positive for apnea (HX NATASHA). Negative for cough, chest tightness, shortness of breath and wheezing.    Cardiovascular:  Positive for palpitations (Afib: s/p cardiac ablation) and leg swelling. Negative for chest pain.   Gastrointestinal:  Positive for abdominal distention. Negative for abdominal pain, diarrhea, nausea and vomiting.        GERD   Genitourinary:  Positive for decreased urine volume (makes a small amount of urine). Negative for dysuria, flank pain and urgency.   Musculoskeletal:  Positive for arthralgias (s/p right knee replacement) and back pain (chronic). Negative for gait problem, neck pain and neck stiffness.   Skin:  Negative for rash.   Allergic/Immunologic: Negative for environmental allergies, food allergies and immunocompromised state.   Neurological:  Negative for dizziness, weakness, light-headedness and headaches.   Psychiatric/Behavioral:  Positive for sleep disturbance. Negative for agitation and confusion. The patient is not nervous/anxious.        Objective:   body mass index is 35.9 kg/m².  BP (!) 161/93 (BP Location: Right arm, Patient Position: Sitting)   Pulse 70   Temp 97.3 °F (36.3 °C) (Temporal)   Resp 18   Ht 5' 8" (1.727 m)   Wt 107.1 kg (236 lb 1.8 oz)   SpO2 99%   BMI 35.90 kg/m²     Physical Exam  Constitutional:       Appearance: Normal appearance. He is well-developed.   HENT:      Head: Normocephalic.      Nose: Nose normal.   Eyes:      Conjunctiva/sclera: Conjunctivae normal.      Pupils: Pupils are equal, round, and reactive to light.   Cardiovascular:      Rate and Rhythm: Normal rate and regular rhythm.      Heart sounds: Normal heart sounds.   Pulmonary:      Effort: Pulmonary effort is normal.      Breath sounds: Normal breath sounds.   Abdominal:      General: Bowel sounds are normal. There is distension.      Palpations: Abdomen is soft. " "  Musculoskeletal:      Cervical back: Normal range of motion and neck supple.      Right lower leg: Edema (+2) present.      Left lower leg: Edema (+2) present.   Skin:     General: Skin is warm and dry.   Neurological:      Mental Status: He is alert and oriented to person, place, and time.   Psychiatric:         Behavior: Behavior normal.         Labs:  Lab Results   Component Value Date    WBC 11.17 02/06/2025    HGB 12.6 (L) 07/28/2025    HCT 24.0 (L) 02/06/2025     (L) 02/06/2025    K 3.8 02/06/2025     02/06/2025    CO2 21 (L) 02/06/2025    BUN 48 (H) 06/13/2025    CREATININE 4.0 (H) 02/06/2025    EGFRNORACEVR 16.2 (A) 02/06/2025    CALCIUM 7.5 (L) 02/06/2025    PHOS 4.1 02/06/2025    MG 1.7 02/06/2025    ALBUMIN 1.7 (L) 02/06/2025    AST 41 (H) 02/06/2025    ALT 30 02/06/2025     (H) 07/09/2025       Lab Results   Component Value Date    BILIRUBINUA Negative 02/04/2025    PROTEINUA 2+ (A) 02/04/2025    NITRITE Negative 02/04/2025    RBCUA 21 (H) 02/04/2025    WBCUA >100 (H) 02/04/2025       No results found for: "HLAABCTYPE"    Lab Results   Component Value Date    CPRA 11 05/07/2025    JG8YMJT A11 05/07/2025    CIABCLM WEAK:A24 05/07/2025    CIIAB Negative 05/07/2025    ABCMT DQ6 11/07/2024       Labs were reviewed with the patient.    Pre-transplant Workup:   Reviewed with the patient.    Assessment:     1. Patient on waiting list for kidney transplant    2. Benign hypertension with CKD (chronic kidney disease) stage V    3. Class 2 severe obesity due to excess calories with serious comorbidity and body mass index (BMI) of 36.0 to 36.9 in adult    4. Secondary hyperparathyroidism      Plan:      1/2025 -hospitalized with Ruptured Cyst-infected at [facility]     -current issue -Recurrent UTI   Is now  taking cefpodoxime 100 mg daily for UTI prophylaxis    -follows with Dr Gary /urology   Will need to clarify --PKD with chronic  kidney infections requiring antibiotics prophylaxis  " tx    Need recs for txp , ? If needs nephrectomy  prior to txp       Transplant Candidacy:   Mr. Phillip is an unacceptable kidney transplant candidate d/t BMI > center guidelines and ABN stress test.  Meets center eligibility for accepting HCV+ donor offer - Yes.  Patient educated on HCV+ donors. Kenneth is willing  to accept HCV+ donor offer -  Yes   Patient is a candidate for KDPI > 85 kidney donor offer - No d/t weight  He has experienced health changes that warrant further investigation.  Patient will be placed in an inactive status at present.    Patient advised that it is recommended that all transplant candidates, and their close contacts and household members receive Covid vaccination.    Carly Bates NP       Follow-up:   In addition to the tests noted in the plan, Mr. Phillip will continue to have reevaluation as per the standing pre-kidney transplant protocol:  Monthly blood for PRA  Annual return to clinic, except HIV positive, > 65 years of age, or pancreas transplant candidates who will be scheduled to see transplant every 6 months while in pre-transplant phase  Annual re-testing: CXR, EKG, yearly mammograms for women over 40 and PSA for males over 40, cardiology follow-up as recommended by initial cardiology pre-transplant evaluation  Renal ultrasound every 2 years  Baseline colonoscopy after age 50 and repeated as recommended    UNOS Patient Status  Functional Status: 80% - Normal activity with effort: some symptoms of disease  Physical Capacity: No Limitations

## 2025-08-05 NOTE — LETTER
August 6, 2025        Sidney Broussard  [address]  [facility] [address]  Phone: [phone]  Fax: [phone]             Olu Brewer- Transplant [facility]  [address]  [facility] [address]  Phone: [phone]   Patient: Kenneth Phillip   MR Number: [id number]   YOB: 1965   Date of Visit: 8/5/2025       Dear Dr. Sidney Broussard    Thank you for referring Kenneth Phillip to me for evaluation. Attached you will find relevant portions of my assessment and plan of care.    If you have questions, please do not hesitate to call me. I look forward to following Kenneth Phillip along with you.    Sincerely,    Carly Bates, NP    Enclosure    If you would like to receive this communication electronically, please contact [email or web address] or [phone] to request Voxy Link access.    Voxy Link is a tool which provides read-only access to select patient information with whom you have a relationship. Its easy to use and provides real time access to review your patients record including encounter summaries, notes, results, and demographic information.    If you feel you have received this communication in error or would no longer like to receive these types of communications, please e-mail [email or web address]

## 2025-08-06 PROCEDURE — 36415 COLL VENOUS BLD VENIPUNCTURE: CPT | Mod: TXP

## 2025-08-06 PROCEDURE — 99001 SPECIMEN HANDLING PT-LAB: CPT | Mod: TXP | Performed by: NURSE PRACTITIONER

## 2025-08-06 NOTE — PROGRESS NOTES
YEARLY LIST MANAGEMENT NOTE    Kenneth Phillip's kidney transplant listing status reviewed.  Patient is due for follow-up appointments on 8/2026.  Appointments will be scheduled per protocol.  Inactivr today per odette Bates. Pt on antibiotics for chronic kidney infection. Dose he need nephrectomy before transplant?  Sent question to transplant nephrologist.

## 2025-08-11 ENCOUNTER — LAB VISIT (OUTPATIENT)
Dept: LAB | Facility: HOSPITAL | Age: 60
End: 2025-08-11
Payer: COMMERCIAL

## 2025-08-11 DIAGNOSIS — Z76.82 AWAITING ORGAN TRANSPLANT: ICD-10-CM

## 2025-08-11 PROCEDURE — 99001 SPECIMEN HANDLING PT-LAB: CPT | Mod: TXP | Performed by: NURSE PRACTITIONER

## 2025-08-11 PROCEDURE — 36415 COLL VENOUS BLD VENIPUNCTURE: CPT | Mod: TXP

## 2025-08-13 ENCOUNTER — TELEPHONE (OUTPATIENT)
Dept: TRANSPLANT | Facility: CLINIC | Age: 60
End: 2025-08-13
Payer: COMMERCIAL

## 2025-08-14 ENCOUNTER — TELEPHONE (OUTPATIENT)
Dept: TRANSPLANT | Facility: CLINIC | Age: 60
End: 2025-08-14
Payer: COMMERCIAL

## 2025-08-18 ENCOUNTER — LAB VISIT (OUTPATIENT)
Dept: LAB | Facility: HOSPITAL | Age: 60
End: 2025-08-18
Payer: COMMERCIAL

## 2025-08-18 DIAGNOSIS — Z76.82 AWAITING ORGAN TRANSPLANT: ICD-10-CM

## 2025-08-20 ENCOUNTER — TELEPHONE (OUTPATIENT)
Dept: TRANSPLANT | Facility: CLINIC | Age: 60
End: 2025-08-20
Payer: COMMERCIAL
